# Patient Record
Sex: MALE | Race: NATIVE HAWAIIAN OR OTHER PACIFIC ISLANDER
[De-identification: names, ages, dates, MRNs, and addresses within clinical notes are randomized per-mention and may not be internally consistent; named-entity substitution may affect disease eponyms.]

---

## 2018-02-08 ENCOUNTER — HOSPITAL ENCOUNTER (INPATIENT)
Dept: HOSPITAL 31 - C.ER | Age: 83
LOS: 5 days | Discharge: HOME | DRG: 683 | End: 2018-02-13
Attending: FAMILY MEDICINE | Admitting: FAMILY MEDICINE
Payer: COMMERCIAL

## 2018-02-08 VITALS — BODY MASS INDEX: 26.4 KG/M2

## 2018-02-08 DIAGNOSIS — E11.649: ICD-10-CM

## 2018-02-08 DIAGNOSIS — N40.0: ICD-10-CM

## 2018-02-08 DIAGNOSIS — I50.9: ICD-10-CM

## 2018-02-08 DIAGNOSIS — Z93.3: ICD-10-CM

## 2018-02-08 DIAGNOSIS — E11.65: ICD-10-CM

## 2018-02-08 DIAGNOSIS — I13.0: ICD-10-CM

## 2018-02-08 DIAGNOSIS — N18.4: ICD-10-CM

## 2018-02-08 DIAGNOSIS — N17.9: Primary | ICD-10-CM

## 2018-02-08 DIAGNOSIS — N13.6: ICD-10-CM

## 2018-02-08 DIAGNOSIS — Z79.4: ICD-10-CM

## 2018-02-08 DIAGNOSIS — Q60.0: ICD-10-CM

## 2018-02-08 DIAGNOSIS — Z86.73: ICD-10-CM

## 2018-02-08 DIAGNOSIS — E78.5: ICD-10-CM

## 2018-02-08 DIAGNOSIS — Z85.048: ICD-10-CM

## 2018-02-08 DIAGNOSIS — D64.9: ICD-10-CM

## 2018-02-08 LAB
ALBUMIN SERPL-MCNC: 3.7 G/DL (ref 3.5–5)
ALBUMIN/GLOB SERPL: 1 {RATIO} (ref 1–2.1)
ALT SERPL-CCNC: 25 U/L (ref 21–72)
AST SERPL-CCNC: 21 U/L (ref 17–59)
BACTERIA #/AREA URNS HPF: (no result) /[HPF]
BASOPHILS # BLD AUTO: 0 K/UL (ref 0–0.2)
BASOPHILS NFR BLD: 0.1 % (ref 0–2)
BILIRUB UR-MCNC: NEGATIVE MG/DL
BUN SERPL-MCNC: 44 MG/DL (ref 9–20)
CALCIUM SERPL-MCNC: 8.8 MG/DL (ref 8.6–10.4)
COLOR UR: YELLOW
EOSINOPHIL # BLD AUTO: 0 K/UL (ref 0–0.7)
EOSINOPHIL NFR BLD: 0.3 % (ref 0–4)
ERYTHROCYTE [DISTWIDTH] IN BLOOD BY AUTOMATED COUNT: 17.8 % (ref 11.5–14.5)
GFR NON-AFRICAN AMERICAN: 20
GLUCOSE UR STRIP-MCNC: NORMAL MG/DL
HGB BLD-MCNC: 7.9 G/DL (ref 12–18)
LEUKOCYTE ESTERASE UR-ACNC: (no result) LEU/UL
LYMPHOCYTES # BLD AUTO: 1.5 K/UL (ref 1–4.3)
LYMPHOCYTES NFR BLD AUTO: 13 % (ref 20–40)
MCH RBC QN AUTO: 21 PG (ref 27–31)
MCHC RBC AUTO-ENTMCNC: 32.4 G/DL (ref 33–37)
MCV RBC AUTO: 64.9 FL (ref 80–94)
MONOCYTES # BLD: 1.3 K/UL (ref 0–0.8)
MONOCYTES NFR BLD: 11 % (ref 0–10)
NEUTROPHILS # BLD: 9 K/UL (ref 1.8–7)
NEUTROPHILS NFR BLD AUTO: 75.6 % (ref 50–75)
NRBC BLD AUTO-RTO: 0 % (ref 0–2)
PH UR STRIP: 7 [PH] (ref 5–8)
PLATELET # BLD: 195 K/UL (ref 130–400)
PMV BLD AUTO: 7.2 FL (ref 7.2–11.7)
PROT UR STRIP-MCNC: (no result) MG/DL
RBC # BLD AUTO: 3.75 MIL/UL (ref 4.4–5.9)
RBC # UR STRIP: NEGATIVE /UL
SP GR UR STRIP: 1.01 (ref 1–1.03)
URINE NITRATE: NEGATIVE
UROBILINOGEN UR-MCNC: NORMAL MG/DL (ref 0.2–1)
WBC # BLD AUTO: 11.9 K/UL (ref 4.8–10.8)
WBC CLUMPS # UR AUTO: (no result) /HPF

## 2018-02-08 RX ADMIN — HUMAN INSULIN SCH: 100 INJECTION, SOLUTION SUBCUTANEOUS at 21:21

## 2018-02-08 RX ADMIN — INSULIN DETEMIR SCH: 100 INJECTION, SOLUTION SUBCUTANEOUS at 21:20

## 2018-02-08 NOTE — CP.PCM.CON
History of Present Illness





- History of Present Illness


History of Present Illness: 





DICTATED





Past Patient History





- Past Medical History & Family History


Past Medical History?: Yes





- Past Social History


Smoking Status: Never Smoked





- CARDIAC


Hx Congestive Heart Failure: Yes


Hx Hypercholesterolemia: Yes


Hx Hypertension: Yes





- PULMONARY


Hx Respiratory Disorders: No





- NEUROLOGICAL


Hx Neurological Disorder: No





- HEENT


Hx HEENT Problems: No


Other/Comment: Reading glasses





- RENAL


Hx Chronic Kidney Disease: Yes





- ENDOCRINE/METABOLIC


Hx Endocrine Disorders: Yes


Hx Diabetes Mellitus Type 2: Yes





- HEMATOLOGICAL/ONCOLOGICAL


Hx Anemia: Yes





- INTEGUMENTARY


Hx Dermatological Problems: No





- MUSCULOSKELETAL/RHEUMATOLOGICAL


Hx Arthritis: Yes





- GASTROINTESTINAL


Hx Gastrointestinal Disorders: Yes


Hx Colostomy: Yes (left , colon cancer)


Other/Comment: Hx of colon cancer





- GENITOURINARY/GYNECOLOGICAL


Hx Genitourinary Disorders: Yes


Hx Prostate Problems: Yes


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





- PSYCHIATRIC


Hx Substance Use: No





- SURGICAL HISTORY


Hx Surgeries: Yes


Other/Comment: COLON RESECTION 2001 WITH COLOSTOMY; COLOSTOMY REVISION 2007





- ANESTHESIA


Hx Anesthesia: Yes


Hx Anesthesia Reactions: No


Hx Malignant Hyperthermia: No





Meds


Allergies/Adverse Reactions: 


 Allergies











Allergy/AdvReac Type Severity Reaction Status Date / Time


 


No Known Allergies Allergy   Verified 02/08/18 09:13














- Medications


Medications: 


 Current Medications





Acetaminophen (Tylenol 325mg Tab)  650 mg PO Q6 PRN


   PRN Reason: Fever >100.4 F


   Last Admin: 02/08/18 16:08 Dose:  650 mg


Clopidogrel Bisulfate (Plavix)  75 mg PO DAILY [facility]


Heparin Sodium (Porcine) (Heparin)  5,000 units SC Q8 [facility]


Sodium Chloride (Sodium Chloride 0.9%)  1,000 mls @ 80 mls/hr IV .[id number] [facility]


   Stop: 02/10/18 23:59


   Last Admin: 02/08/18 16:13 Dose:  80 mls/hr


Meropenem 500 mg/ Sodium (Chloride)  100 mls @ 100 mls/hr IVPB Q12H [facility]


Insulin Detemir (Levemir)  18 unit SC HS MANDEEP


Insulin Human Regular (Novolin R)  0 unit SC ACHS MANDEPE


   PRN Reason: Protocol


   Last Admin: 02/08/18 17:53 Dose:  Not Given


Multivitamins (Hexavitamin)  1 tab PO DAILY [facility]


Pantoprazole Sodium (Protonix Ec Tab)  40 mg PO DAILY [facility]


Rosuvastatin Calcium (Crestor)  10 mg PO HS [facility]


Tamsulosin HCl (Flomax)  0.4 mg PO DAILY [facility]











Results





- Vital Signs


Recent Vital Signs: 


 Last Vital Signs











Temp  101.4 F H  02/08/18 17:58


 


Pulse  90   02/08/18 17:58


 


Resp  18   02/08/18 17:58


 


BP  90/55 L  02/08/18 17:58


 


Pulse Ox  100   02/08/18 18:32














- Labs


Result Diagrams: 


 02/08/18 10:22





 02/08/18 10:22


Labs: 


 Laboratory Results - last 24 hr











  02/08/18 02/08/18 02/08/18





  09:36 10:22 10:22


 


WBC   11.9 H D 


 


RBC   3.75 L 


 


Hgb   7.9 L 


 


Hct   24.4 L 


 


MCV   64.9 L 


 


MCH   21.0 L 


 


MCHC   32.4 L 


 


RDW   17.8 H 


 


Plt Count   195 


 


MPV   7.2 


 


Neut % (Auto)   75.6 H 


 


Lymph % (Auto)   13.0 L 


 


Mono % (Auto)   11.0 H 


 


Eos % (Auto)   0.3 


 


Baso % (Auto)   0.1 


 


Neut # (Auto)   9.0 H 


 


Lymph # (Auto)   1.5 


 


Mono # (Auto)   1.3 H 


 


Eos # (Auto)   0.0 


 


Baso # (Auto)   0.0 


 


Sodium   


 


Potassium   


 


Chloride   


 


Carbon Dioxide   


 


Anion Gap   


 


BUN   


 


Creatinine   


 


Est GFR ( Amer)   


 


Est GFR (Non-Af Amer)   


 


POC Glucose (mg/dL)   


 


Random Glucose   


 


Calcium   


 


Total Bilirubin   


 


AST   


 


ALT   


 


Alkaline Phosphatase   


 


Total Protein   


 


Albumin   


 


Globulin   


 


Albumin/Globulin Ratio   


 


Urine Color    Yellow


 


Urine Clarity    Turbid


 


Urine pH    7.0


 


Ur Specific Gravity    1.012


 


Urine Protein    2+ H


 


Urine Glucose (UA)    Normal


 


Urine Ketones    Negative


 


Urine Blood    Negative


 


Urine Nitrate    Negative


 


Urine Bilirubin    Negative


 


Urine Urobilinogen    Normal


 


Ur Leukocyte Esterase    3+ H


 


Urine WBC (Auto)    2005 H


 


Urine RBC (Auto)    7 H


 


Urine WBC Clumps (Auto)    Many H


 


Urine Bacteria    Occ H


 


Influenza Typ A,B (EIA)  Negative for flu a/b  














  02/08/18 02/08/18





  10:22 17:51


 


WBC  


 


RBC  


 


Hgb  


 


Hct  


 


MCV  


 


MCH  


 


MCHC  


 


RDW  


 


Plt Count  


 


MPV  


 


Neut % (Auto)  


 


Lymph % (Auto)  


 


Mono % (Auto)  


 


Eos % (Auto)  


 


Baso % (Auto)  


 


Neut # (Auto)  


 


Lymph # (Auto)  


 


Mono # (Auto)  


 


Eos # (Auto)  


 


Baso # (Auto)  


 


Sodium  133 


 


Potassium  5.4 H 


 


Chloride  104 


 


Carbon Dioxide  20 L 


 


Anion Gap  15 


 


BUN  44 H 


 


Creatinine  3.0 H 


 


Est GFR ( Amer)  24 


 


Est GFR (Non-Af Amer)  20 


 


POC Glucose (mg/dL)   73


 


Random Glucose  142 H 


 


Calcium  8.8 


 


Total Bilirubin  1.4 H 


 


AST  21 


 


ALT  25 


 


Alkaline Phosphatase  90 


 


Total Protein  7.2 


 


Albumin  3.7 


 


Globulin  3.6 


 


Albumin/Globulin Ratio  1.0 


 


Urine Color  


 


Urine Clarity  


 


Urine pH  


 


Ur Specific Gravity  


 


Urine Protein  


 


Urine Glucose (UA)  


 


Urine Ketones  


 


Urine Blood  


 


Urine Nitrate  


 


Urine Bilirubin  


 


Urine Urobilinogen  


 


Ur Leukocyte Esterase  


 


Urine WBC (Auto)  


 


Urine RBC (Auto)  


 


Urine WBC Clumps (Auto)  


 


Urine Bacteria  


 


Influenza Typ A,B (EIA)

## 2018-02-08 NOTE — C.PDOC
History Of Present Illness


84 y/o male with PMHx of Rectal cancer, Renal resection and Incontinence, HTN 

and DM presents to ED with complaints of fever and dysuria since yesterday. 

Patient also complaints of back pain for 1 month for which he states his PMD 

ordered an MRI and xray. Patient denies change in sensation, cough, sob, chest 

pain, vomiting, diarrhea or any other complaints at this time. 


Time Seen by Provider: 02/08/18 09:04


Chief Complaint (Nursing): Male Genitourinary


History Per: Patient


History/Exam Limitations: no limitations


Onset/Duration Of Symptoms: Days


Current Symptoms Are (Timing): Still Present





Past Medical History


Reviewed: Historical Data, Nursing Documentation, Vital Signs


Vital Signs: 


 Last Vital Signs











Temp  101.4 F H  02/08/18 17:58


 


Pulse  90   02/08/18 17:58


 


Resp  18   02/08/18 17:58


 


BP  90/55 L  02/08/18 17:58


 


Pulse Ox  100   02/08/18 18:32














- Medical History


PMH: Anemia, Arthritis, CHF, Diabetes, HTN, Hypercholesterolemia, Hyperlipidemia

, Chronic Kidney Disease


Other Surgeries: colectomy:2001, hernia repair : 2007;  left renal resection: 

2017,





- [facility] Procedures








CLOSED ENDOSCOPIC BIOPSY OF LARGE INTESTINE (05/16/13)


CYSTOSCOPY NEC (08/22/13)


ESOPHAGOGASTRODUODENOSCOPY [EGD] W/CLOSED BIOPSY (06/21/15)


HEMODIALYSIS (06/21/15)


INSERT INDWELLING CATH (09/06/13)


URETHRAL DILATION (08/22/13)


VENOUS CATHETERIZATION FOR RENAL DIALYSIS (06/21/15)








Family History: States: No Known Family Hx





- Social History


Hx Tobacco Use: No


Hx Alcohol Use: No


Hx Substance Use: No





- Immunization History


Hx Tetanus Toxoid Vaccination: No


Hx Influenza Vaccination: No


Hx Pneumococcal Vaccination: No





Review Of Systems


Constitutional: Positive for: Fever.  Negative for: Chills


Gastrointestinal: Negative for: Nausea, Vomiting


Genitourinary: Positive for: Dysuria.  Negative for: Hematuria


Musculoskeletal: Positive for: Back Pain


Skin: Negative for: Rash


Neurological: Negative for: Weakness, Numbness





Physical Exam





- Physical Exam


Appears: Non-toxic, No Acute Distress


Skin: Warm, Dry, Pale, No Rash


Head: Atraumatic, Normacephalic


Eye(s): bilateral: Normal Inspection, EOMI


Nose: Normal


Oral Mucosa: Moist


Neck: Normal ROM, Supple


Chest: Symmetrical


Cardiovascular: Rhythm Regular


Respiratory: Normal Breath Sounds, No Accessory Muscle Use, No Rales, No Rhonchi

, No Wheezing


Gastrointestinal/Abdominal: Soft, No Tenderness, No Guarding, No Rebound, Other 

((+) LLQ  colostomy)


Back: No Vertebral Tenderness, Other (Paralumbar tenderness)


Extremity: Normal ROM


Neurological/Psych: Oriented x3





ED Course And Treatment





- Laboratory Results


Result Diagrams: 


 02/08/18 10:22





 02/08/18 10:22


O2 Sat by Pulse Oximetry: 100 (RA)


Pulse Ox Interpretation: Normal


Progress Note: Influenza Test, Blood work ordered. Tylenol administered.  Case 

discussed with dr Benitez, who requests hospitalist admission and Dr Benitez 

for ID.  Case discussed with Dr Jones, agreed upon plan and treatment.





Disposition





- Disposition


Disposition: HOSPITALIZED


Disposition Time: 14:00


Condition: STABLE





- Clinical Impression


Clinical Impression: 


 Acute renal failure, UTI (urinary tract infection)








- PA / NP / Resident Statement


MD/DO has reviewed & agrees with the documentation as recorded.





- Scribe Statement


The provider has reviewed the documentation as recorded by the Scribaida Kong





All medical record entries made by the Pedro Pablo were at my direction and 

personally dictated by me. I have reviewed the chart and agree that the record 

accurately reflects my personal performance of the history, physical exam, 

medical decision making, and the department course for this patient. I have 

also personally directed, reviewed, and agree with the discharge instructions 

and disposition.

## 2018-02-08 NOTE — CT
PROCEDURE:  CT Abdomen and Pelvis without Oral or IV contrast.



HISTORY:

check for nephrolithiasis or pyelonephritis



COMPARISON:

CT of the abdomen and pelvis without IV contrast performed 11/30/16



TECHNIQUE:

Contiguous axial images of the abdomen and pelvis. No oral or IV 

contrast administered. Coronal and Sagittal reformats generated and 

reviewed. 



Radiation dose:



Total exam DLP = 739.64 mGy-cm.



This CT exam was performed using one or more of the following dose 

reduction techniques: Automated exposure control, adjustment of the 

mA and/or kV according to patient size, and/or use of iterative 

reconstruction technique.



FINDINGS:

There is limited evaluation of the solid organs without the 

administration of IV contrast.



LOWER THORAX:

Bibasilar atelectasis. There is no visible pleural effusion or 

pneumothorax. 



LIVER:

Unremarkable unenhanced appearance. 



GALLBLADDER AND BILE DUCTS:

Cholelithiasis. 



PANCREAS:

Unremarkable unenhanced appearance. 



SPLEEN:

Unremarkable unenhanced appearance. 



ADRENALS:

Unremarkable unenhanced appearance. 



KIDNEYS AND URETERS:

Right perinephric stranding. Moderate hydroureteronephrosis without 

obstructing calculus evident. Point of obstruction of the distal 

ureter appears near coarse vascular calcifications in the right 

pelvis. The left kidney is absent, by history surgically resected.



BLADDER:

The urinary bladder appears unremarkable.



REPRODUCTIVE:

Unremarkable. 



APPENDIX:

The appendix appears within normal limits of caliber.



BOWEL:

The stomach is nondistended. 



Lack of oral contrast limits evaluation for bowel pathology. No 

evidence of bowel obstruction. Large left peristomal herniation 

containing small and large bowel loops.  Left-sided colostomy. Status 

post distal large bowel resection. Moderate-sized right inguinal 

hernia containing loops of small bowel without evidence of 

obstruction. 



PERITONEUM:

No significant free fluid. No definite free air.



LYMPH NODES:

No bulky lymphadenopathy appreciated.



VASCULATURE:

Atherosclerotic calcifications. No aortic aneurysm. 



BONES:

Osseous demineralization.  Degenerative changes. Scoliosis. 

Nonspecific sclerotic focus within the left femur. 



OTHER FINDINGS:

Re-identified prominent presacral soft tissue. 



Previously demonstrated masslike density within the midline lower 

abdomen not appreciated on the current noncontrast study. Correlate 

clinically. 



Postsurgical changes, anterior abdominal wall. Presacral postsurgical 

changes re-identified. 



IMPRESSION:

Right perinephric stranding. Moderate hydroureteronephrosis without 

obstructing calculus evident. Point of obstruction of the distal 

ureter appears likely near coarse vascular calcifications in the 

right pelvis. The left kidney is absent, by history surgically 

resected. 



Re-identified prominent presacral soft tissue. 



Large left peristomal herniation containing small and large bowel 

loops. Left-sided colostomy. Status post distal large bowel 

resection. Moderate-sized right inguinal hernia containing loops of 

small bowel without evidence of obstruction. 



Previously demonstrated masslike density within the midline lower 

abdomen not appreciated on the current noncontrast study. Correlate 

clinically. 



Cholelithiasis. 



Additional findings as above.

## 2018-02-08 NOTE — CP.PCM.HP
<Ariel Yao - Last Filed: 02/08/18 19:08>





History of Present Illness





- History of Present Illness


History of Present Illness: 





PGY-1 H&P for Dr. Jones


CC: "burning with urination and fever"





This is a 85 year old male with PMHx of rectal cancer s/p colectomy in 2001, 

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 

mass s/p resection in 2017, HTN, DM, HLD who complains of fever and burning 

with urination. The fever began 2 days ago; however, the dysuria began 

yesterday. Patient measured his temperature at home 103 degrees yesterday and 

102 degrees this morning before coming into the ED. Per patient, he will 

experience a UTI about once per year. Patient admits associated urinary 

frequency yesterday. Per patient, it felt like he was urinating every hour. 

Patient denies flank pain but admits chronic lower back pain for the past few 

weeks. Patient denies chills, chest pain, dyspnea, abdominal pain.  








PMHx: rectal cancer s/p colectomy in 2001, congenital left sided renal agenesis 

diagnosed during the colectomy, left renal mass s/p resection in 2017, HTN, DM, 

HLD


PSHx: colectomy with colostomy formation on the right side of the abdomen in 

2001, right sided hernia repair in 2007 with movement of ostomy to the left 

side of the abdomen in 2012, left renal mass removal in January 2017,  


Allergies: NKDA


Social: Denies tobacco, alcohol, drugs. Lives with his wife.


Family Hx: denies





PMD: Dr. Jessee Benitez


ID: Dr. Anil Benitez


Nephro: Dr. Seo





Home meds: Plavix 75 mg PO daily, Glimepiride 4 mg PO daily, Januvia 25 mg PO 

daily, Flomax 0.4 mg PO daily, Lipitor 20 mg PO daily, Ferrous sulfate 325 mg 

PO BID, Levemir 18 units SC HS, Loratadine 10 mg PO daily, Ramipril 10 mg PO 

daily, folic acid 1 mg tab PO daily





Present on Admission





- Present on Admission


Any Indicators Present on Admission: No





Review of Systems





- Constitutional


Constitutional: Fever.  absent: Chills





- EENT


Eyes: absent: Change in Vision


Ears: absent: Decreased Hearing


Nose/Mouth/Throat: absent: Nasal Congestion





- Cardiovascular


Cardiovascular: absent: Chest Pain





- Respiratory


Respiratory: absent: Cough, Dyspnea





- Gastrointestinal


Gastrointestinal: absent: Abdominal Pain, Constipation, Diarrhea, Hematochezia, 

Melena, Nausea, Vomiting





- Genitourinary


Genitourinary: Dysuria, Urinary Frequency.  absent: Hematuria





- Musculoskeletal


Musculoskeletal: Back Pain





- Integumentary


Integumentary: absent: Rash





- Neurological


Neurological: absent: Weakness





- Psychiatric


Psychiatric: absent: Anxiety, Change in Appetite





- Endocrine


Endocrine: absent: Palpitations





Past Patient History





- Past Medical History & Family History


Past Medical History?: Yes





- Past Social History


Smoking Status: Never Smoked





- CARDIAC


Hx Congestive Heart Failure: Yes


Hx Hypercholesterolemia: Yes


Hx Hypertension: Yes





- PULMONARY


Hx Respiratory Disorders: No





- NEUROLOGICAL


Hx Neurological Disorder: No





- HEENT


Hx HEENT Problems: No


Other/Comment: Reading glasses





- RENAL


Hx Chronic Kidney Disease: Yes





- ENDOCRINE/METABOLIC


Hx Endocrine Disorders: Yes


Hx Diabetes Mellitus Type 2: Yes





- HEMATOLOGICAL/ONCOLOGICAL


Hx Anemia: Yes





- INTEGUMENTARY


Hx Dermatological Problems: No





- MUSCULOSKELETAL/RHEUMATOLOGICAL


Hx Arthritis: Yes





- GASTROINTESTINAL


Hx Gastrointestinal Disorders: Yes


Hx Colostomy: Yes (left , colon cancer)


Other/Comment: Hx of colon cancer





- GENITOURINARY/GYNECOLOGICAL


Hx Genitourinary Disorders: Yes


Hx Prostate Problems: Yes


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





- PSYCHIATRIC


Hx Substance Use: No





- SURGICAL HISTORY


Hx Surgeries: Yes


Other/Comment: COLON RESECTION 2001 WITH COLOSTOMY; COLOSTOMY REVISION 2007





- ANESTHESIA


Hx Anesthesia: Yes


Hx Anesthesia Reactions: No


Hx Malignant Hyperthermia: No





Meds


Allergies/Adverse Reactions: 


 Allergies











Allergy/AdvReac Type Severity Reaction Status Date / Time


 


No Known Allergies Allergy   Verified 02/08/18 09:13














Physical Exam





- Constitutional


Appears: No Acute Distress





- Head Exam


Head Exam: ATRAUMATIC, NORMOCEPHALIC





- Eye Exam


Eye Exam: EOMI, Normal appearance





- ENT Exam


ENT Exam: Mucous Membranes Moist





- Respiratory Exam


Respiratory Exam: Clear to Auscultation Bilateral, NORMAL BREATHING PATTERN.  

absent: Rales, Rhonchi, Wheezes





- Cardiovascular Exam


Cardiovascular Exam: REGULAR RHYTHM, +S1, +S2





- GI/Abdominal Exam


GI & Abdominal Exam: Normal Bowel Sounds, Soft.  absent: Tenderness


Additional comments: 





Left sided colostomy with no evidence of infection in the surrounding regions





- Extremities Exam


Extremities exam: Positive for: pedal pulses present.  Negative for: pedal edema

, tenderness





- Neurological Exam


Neurological exam: Alert, CN II-XII Intact, Oriented x3





- Psychiatric Exam


Psychiatric exam: Normal Affect, Normal Mood





- Skin


Skin Exam: Dry, Warm


Additional comments: 





Surgical scar in the center of the lower abdomen. Colostomy on the left lower 

abdomen with no evidence of infection in the surrounding area.





Results





- Vital Signs


Recent Vital Signs: 





 Last Vital Signs











Temp  100.4 F H  02/08/18 10:51


 


Pulse  97 H  02/08/18 08:55


 


Resp  20   02/08/18 08:55


 


BP  123/84   02/08/18 08:55


 


Pulse Ox  100   02/08/18 09:45














- Labs


Result Diagrams: 


 02/08/18 10:22





 02/08/18 10:22


Labs: 





 Laboratory Results - last 24 hr











  02/08/18 02/08/18 02/08/18





  09:36 10:22 10:22


 


WBC   11.9 H D 


 


RBC   3.75 L 


 


Hgb   7.9 L 


 


Hct   24.4 L 


 


MCV   64.9 L 


 


MCH   21.0 L 


 


MCHC   32.4 L 


 


RDW   17.8 H 


 


Plt Count   195 


 


MPV   7.2 


 


Neut % (Auto)   75.6 H 


 


Lymph % (Auto)   13.0 L 


 


Mono % (Auto)   11.0 H 


 


Eos % (Auto)   0.3 


 


Baso % (Auto)   0.1 


 


Neut # (Auto)   9.0 H 


 


Lymph # (Auto)   1.5 


 


Mono # (Auto)   1.3 H 


 


Eos # (Auto)   0.0 


 


Baso # (Auto)   0.0 


 


Sodium   


 


Potassium   


 


Chloride   


 


Carbon Dioxide   


 


Anion Gap   


 


BUN   


 


Creatinine   


 


Est GFR ( Amer)   


 


Est GFR (Non-Af Amer)   


 


Random Glucose   


 


Calcium   


 


Total Bilirubin   


 


AST   


 


ALT   


 


Alkaline Phosphatase   


 


Total Protein   


 


Albumin   


 


Globulin   


 


Albumin/Globulin Ratio   


 


Urine Color    Yellow


 


Urine Clarity    Turbid


 


Urine pH    7.0


 


Ur Specific Gravity    1.012


 


Urine Protein    2+ H


 


Urine Glucose (UA)    Normal


 


Urine Ketones    Negative


 


Urine Blood    Negative


 


Urine Nitrate    Negative


 


Urine Bilirubin    Negative


 


Urine Urobilinogen    Normal


 


Ur Leukocyte Esterase    3+ H


 


Urine WBC (Auto)    2005 H


 


Urine RBC (Auto)    7 H


 


Urine WBC Clumps (Auto)    Many H


 


Urine Bacteria    Occ H


 


Influenza Typ A,B (EIA)  Negative for flu a/b  














  02/08/18





  10:22


 


WBC 


 


RBC 


 


Hgb 


 


Hct 


 


MCV 


 


MCH 


 


MCHC 


 


RDW 


 


Plt Count 


 


MPV 


 


Neut % (Auto) 


 


Lymph % (Auto) 


 


Mono % (Auto) 


 


Eos % (Auto) 


 


Baso % (Auto) 


 


Neut # (Auto) 


 


Lymph # (Auto) 


 


Mono # (Auto) 


 


Eos # (Auto) 


 


Baso # (Auto) 


 


Sodium  133


 


Potassium  5.4 H


 


Chloride  104


 


Carbon Dioxide  20 L


 


Anion Gap  15


 


BUN  44 H


 


Creatinine  3.0 H


 


Est GFR ( Amer)  24


 


Est GFR (Non-Af Amer)  20


 


Random Glucose  142 H


 


Calcium  8.8


 


Total Bilirubin  1.4 H


 


AST  21


 


ALT  25


 


Alkaline Phosphatase  90


 


Total Protein  7.2


 


Albumin  3.7


 


Globulin  3.6


 


Albumin/Globulin Ratio  1.0


 


Urine Color 


 


Urine Clarity 


 


Urine pH 


 


Ur Specific Gravity 


 


Urine Protein 


 


Urine Glucose (UA) 


 


Urine Ketones 


 


Urine Blood 


 


Urine Nitrate 


 


Urine Bilirubin 


 


Urine Urobilinogen 


 


Ur Leukocyte Esterase 


 


Urine WBC (Auto) 


 


Urine RBC (Auto) 


 


Urine WBC Clumps (Auto) 


 


Urine Bacteria 


 


Influenza Typ A,B (EIA) 














Assessment & Plan





- Assessment and Plan (Free Text)


Plan: 





UTI


+3 leukocyte esterase


f/u urine cultures


ID consult Dr. Benitez, help appreciated


Per Dr. Benitez Azactam 2 gm IV one time dose and Merropenem 500 mg IV Q12H


f/u CT abdomen/pelvis without contrast


f/u abdominal ultrasound





Acute on Chronic Kidney Disease


Baseline creatinine 1.5


NS 80 cc/hr


Nephrology consult Dr. Seo, help appreciated





History of Hypertension


Held home Ramipril due to acute kidney injury


Monitor BP





History of Diabetes


f/u hemoglobin A1c


Resumed home Levemir 18 units HS


Resumed home Januvia 25 mg PO daily


Regular ISS





History of Hyperlipidemia


Home Lipitor not on formulary. 


Crestor 10 mg PO HS





Prophylactic Measure


Heparin SC Q8


Protonix 40 mg PO daily


Diabetic diet





Case DW Dr. Karen Yao PGY-1





<Jayson Jones H - Last Filed: 02/09/18 07:20>





Results





- Vital Signs


Recent Vital Signs: 





 Last Vital Signs











Temp  98.3 F   02/09/18 00:50


 


Pulse  87   02/09/18 00:50


 


Resp  20   02/09/18 00:50


 


BP  96/56 L  02/09/18 00:50


 


Pulse Ox  97   02/09/18 00:50














- Labs


Result Diagrams: 


 02/08/18 10:22





 02/08/18 10:22


Labs: 





 Laboratory Results - last 24 hr











  02/08/18 02/08/18 02/08/18





  09:36 10:22 10:22


 


WBC   11.9 H D 


 


RBC   3.75 L 


 


Hgb   7.9 L 


 


Hct   24.4 L 


 


MCV   64.9 L 


 


MCH   21.0 L 


 


MCHC   32.4 L 


 


RDW   17.8 H 


 


Plt Count   195 


 


MPV   7.2 


 


Neut % (Auto)   75.6 H 


 


Lymph % (Auto)   13.0 L 


 


Mono % (Auto)   11.0 H 


 


Eos % (Auto)   0.3 


 


Baso % (Auto)   0.1 


 


Neut # (Auto)   9.0 H 


 


Lymph # (Auto)   1.5 


 


Mono # (Auto)   1.3 H 


 


Eos # (Auto)   0.0 


 


Baso # (Auto)   0.0 


 


Sodium   


 


Potassium   


 


Chloride   


 


Carbon Dioxide   


 


Anion Gap   


 


BUN   


 


Creatinine   


 


Est GFR ( Amer)   


 


Est GFR (Non-Af Amer)   


 


POC Glucose (mg/dL)   


 


Random Glucose   


 


Calcium   


 


Total Bilirubin   


 


AST   


 


ALT   


 


Alkaline Phosphatase   


 


Total Protein   


 


Albumin   


 


Globulin   


 


Albumin/Globulin Ratio   


 


Urine Color    Yellow


 


Urine Clarity    Turbid


 


Urine pH    7.0


 


Ur Specific Gravity    1.012


 


Urine Protein    2+ H


 


Urine Glucose (UA)    Normal


 


Urine Ketones    Negative


 


Urine Blood    Negative


 


Urine Nitrate    Negative


 


Urine Bilirubin    Negative


 


Urine Urobilinogen    Normal


 


Ur Leukocyte Esterase    3+ H


 


Urine WBC (Auto)    2005 H


 


Urine RBC (Auto)    7 H


 


Urine WBC Clumps (Auto)    Many H


 


Urine Bacteria    Occ H


 


Influenza Typ A,B (EIA)  Negative for flu a/b  














  02/08/18 02/08/18 02/08/18





  10:22 17:51 19:07


 


WBC   


 


RBC   


 


Hgb   


 


Hct   


 


MCV   


 


MCH   


 


MCHC   


 


RDW   


 


Plt Count   


 


MPV   


 


Neut % (Auto)   


 


Lymph % (Auto)   


 


Mono % (Auto)   


 


Eos % (Auto)   


 


Baso % (Auto)   


 


Neut # (Auto)   


 


Lymph # (Auto)   


 


Mono # (Auto)   


 


Eos # (Auto)   


 


Baso # (Auto)   


 


Sodium  133  


 


Potassium  5.4 H  


 


Chloride  104  


 


Carbon Dioxide  20 L  


 


Anion Gap  15  


 


BUN  44 H  


 


Creatinine  3.0 H  


 


Est GFR ( Amer)  24  


 


Est GFR (Non-Af Amer)  20  


 


POC Glucose (mg/dL)   73  51 L


 


Random Glucose  142 H  


 


Calcium  8.8  


 


Total Bilirubin  1.4 H  


 


AST  21  


 


ALT  25  


 


Alkaline Phosphatase  90  


 


Total Protein  7.2  


 


Albumin  3.7  


 


Globulin  3.6  


 


Albumin/Globulin Ratio  1.0  


 


Urine Color   


 


Urine Clarity   


 


Urine pH   


 


Ur Specific Gravity   


 


Urine Protein   


 


Urine Glucose (UA)   


 


Urine Ketones   


 


Urine Blood   


 


Urine Nitrate   


 


Urine Bilirubin   


 


Urine Urobilinogen   


 


Ur Leukocyte Esterase   


 


Urine WBC (Auto)   


 


Urine RBC (Auto)   


 


Urine WBC Clumps (Auto)   


 


Urine Bacteria   


 


Influenza Typ A,B (EIA)   














  02/08/18 02/08/18 02/09/18





  19:40 21:19 06:22


 


WBC   


 


RBC   


 


Hgb   


 


Hct   


 


MCV   


 


MCH   


 


MCHC   


 


RDW   


 


Plt Count   


 


MPV   


 


Neut % (Auto)   


 


Lymph % (Auto)   


 


Mono % (Auto)   


 


Eos % (Auto)   


 


Baso % (Auto)   


 


Neut # (Auto)   


 


Lymph # (Auto)   


 


Mono # (Auto)   


 


Eos # (Auto)   


 


Baso # (Auto)   


 


Sodium   


 


Potassium   


 


Chloride   


 


Carbon Dioxide   


 


Anion Gap   


 


BUN   


 


Creatinine   


 


Est GFR ( Amer)   


 


Est GFR (Non-Af Amer)   


 


POC Glucose (mg/dL)  82  99  63 L


 


Random Glucose   


 


Calcium   


 


Total Bilirubin   


 


AST   


 


ALT   


 


Alkaline Phosphatase   


 


Total Protein   


 


Albumin   


 


Globulin   


 


Albumin/Globulin Ratio   


 


Urine Color   


 


Urine Clarity   


 


Urine pH   


 


Ur Specific Gravity   


 


Urine Protein   


 


Urine Glucose (UA)   


 


Urine Ketones   


 


Urine Blood   


 


Urine Nitrate   


 


Urine Bilirubin   


 


Urine Urobilinogen   


 


Ur Leukocyte Esterase   


 


Urine WBC (Auto)   


 


Urine RBC (Auto)   


 


Urine WBC Clumps (Auto)   


 


Urine Bacteria   


 


Influenza Typ A,B (EIA)   














  02/09/18 02/09/18





  06:24 07:03


 


WBC  


 


RBC  


 


Hgb  


 


Hct  


 


MCV  


 


MCH  


 


MCHC  


 


RDW  


 


Plt Count  


 


MPV  


 


Neut % (Auto)  


 


Lymph % (Auto)  


 


Mono % (Auto)  


 


Eos % (Auto)  


 


Baso % (Auto)  


 


Neut # (Auto)  


 


Lymph # (Auto)  


 


Mono # (Auto)  


 


Eos # (Auto)  


 


Baso # (Auto)  


 


Sodium  


 


Potassium  


 


Chloride  


 


Carbon Dioxide  


 


Anion Gap  


 


BUN  


 


Creatinine  


 


Est GFR ( Amer)  


 


Est GFR (Non-Af Amer)  


 


POC Glucose (mg/dL)  62 L  61 L


 


Random Glucose  


 


Calcium  


 


Total Bilirubin  


 


AST  


 


ALT  


 


Alkaline Phosphatase  


 


Total Protein  


 


Albumin  


 


Globulin  


 


Albumin/Globulin Ratio  


 


Urine Color  


 


Urine Clarity  


 


Urine pH  


 


Ur Specific Gravity  


 


Urine Protein  


 


Urine Glucose (UA)  


 


Urine Ketones  


 


Urine Blood  


 


Urine Nitrate  


 


Urine Bilirubin  


 


Urine Urobilinogen  


 


Ur Leukocyte Esterase  


 


Urine WBC (Auto)  


 


Urine RBC (Auto)  


 


Urine WBC Clumps (Auto)  


 


Urine Bacteria  


 


Influenza Typ A,B (EIA)  














Attending/Attestation





- Attestation


I have personally seen and examined this patient.: Yes


I have fully participated in the care of the patient.: Yes


I have reviewed all pertinent clinical information: Yes


Notes (Text): 





02/09/18 07:20


Medical attending: Patient was seen and examined by me, I saw the patient with 

the medical resident in the emergency room. I reviewed the above note and agree 

with the above note by the resident.





This is a 85 -year-old male past medical history of rectal cancer requiring a 

colostomy creation in 2001. 





He also has a history of left renal agenesis that was found the during 2001 and 

then removal of the remaining left renal mass in 2017





He reported several days of fevers, and dysuria. He also reported some flank 

pain as well asked him if he had further pain in his back and he says that he 

has had long going chronic back pain for some time now. He denied other things 

such as chest pain shortness of breath and abdominal pain.





In the emergency room, UA was done which was highly suggestive of a UTI. He 

also had a documented fever in the emergency room. His creatinine was 3.0, from 

review of recent lab work in December 2017 his creatinine at that time was 1.5 

and it appears that his baseline creatinine is about 1.4-1.5. He does tell us 

that he feels somewhat dry





So at this time considering his intermittent flank pain, his back pain related 

get an abdominal ultrasound to assess for any potential obstruction to the 

kidney as well as a CT scan without contrast to possibly assess for potential 

pyelonephritis.





With regards to IV antibiotics were start Zosyn, can have to be renally dosed 

for the time being until he could be seen by his infectious disease physician 

and the patient will also need to be seen by his nephrologist as well while the 

patient is here. I later learned that infectious disease changed antibiotics 

over to is active and meropenem so at this time were waiting on the urine 

cultures and blood cultures





We are going to place the patient on the intravenous fluids. I reviewed the 

chest x-ray and it does not look fluid overloaded so were to give the patient 

intravenous fluids. I told the residence to check a new chest x-ray tomorrow 

just in case we do make him fluid overload.





Jayson Jones

## 2018-02-08 NOTE — RAD
HISTORY:

pre-admission  



COMPARISON:

12/29/2016 



FINDINGS:



LUNGS:

No consolidation. Central pulmonary vasculature and overall 

interstitial lung markings appear slightly increased yet similar 

appearing and nonspecific ; nevertheless chronic changes are inferred



PLEURA:

No significant pleural effusion identified, no pneumothorax apparent.



CARDIOVASCULAR:

Minimal cardiomegaly -similar



OSSEOUS STRUCTURES:

Thoracic spondylosis.  Bilateral shoulder arthrosis



VISUALIZED UPPER ABDOMEN:

Normal.



OTHER FINDINGS:

None.



IMPRESSION:

No active disease.

## 2018-02-08 NOTE — US
HISTORY:

acute kidney injury



COMPARISON:

CT of the abdomen and pelvis without oral or IV contrast performed 

2/8/18



TECHNIQUE:

Sonographic evaluation of the abdomen.



FINDINGS:



LIVER:

Measures 18.2 cm in sagittal dimension. Echogenic liver may be seen 

in setting of hepatic parenchymal disease or fatty infiltration.  No 

focal hepatic mass identified. The main portal vein appears patent 

with normal directional flow.   No intrahepatic bile duct dilatation.



GALLBLADDER:

Gallstones. No gallbladder wall thickening. Negative sonographic 

Melissa's sign as assessed by the sonographer.



COMMON BILE DUCT:

Measures 4 mm. 



PANCREAS:

Not well visualized.



RIGHT KIDNEY:

Measures 11.2 x 6.1 x 6.8cm.  Moderate right-sided hydronephrosis. No 

obstructing calculus identified.



LEFT KIDNEY:

Not visualized ; surgically removed per patient provided history. 



SPLEEN:

Measures approximately 10.6 cm. 



AORTA:

Limited views appear unremarkable. 



IVC:

Limited views appear unremarkable. 



OTHER FINDINGS:

None. 



IMPRESSION:

Echogenic liver may be seen in setting of hepatic parenchymal disease 

or fatty infiltration.  



Cholelithiasis. 



Moderate right-sided hydronephrosis.  Absent left kidney.

## 2018-02-09 LAB
ALBUMIN SERPL-MCNC: 3.4 G/DL (ref 3.5–5)
ALBUMIN/GLOB SERPL: 0.9 {RATIO} (ref 1–2.1)
ALT SERPL-CCNC: 30 U/L (ref 21–72)
ANISOCYTOSIS BLD QL SMEAR: SLIGHT
AST SERPL-CCNC: 31 U/L (ref 17–59)
BASOPHILS # BLD AUTO: 0 K/UL (ref 0–0.2)
BASOPHILS NFR BLD: 0.2 % (ref 0–2)
BILIRUB UR-MCNC: NEGATIVE MG/DL
BUN SERPL-MCNC: 44 MG/DL (ref 9–20)
BURR CELLS BLD QL SMEAR: SLIGHT
CALCIUM SERPL-MCNC: 8.4 MG/DL (ref 8.6–10.4)
EOSINOPHIL # BLD AUTO: 0.1 K/UL (ref 0–0.7)
EOSINOPHIL NFR BLD: 0.8 % (ref 0–4)
EOSINOPHIL NFR BLD: 1 % (ref 0–4)
ERYTHROCYTE [DISTWIDTH] IN BLOOD BY AUTOMATED COUNT: 18.1 % (ref 11.5–14.5)
GFR NON-AFRICAN AMERICAN: 23
GLUCOSE UR STRIP-MCNC: NORMAL MG/DL
HGB BLD-MCNC: 7.9 G/DL (ref 12–18)
HYPOCHROMIC: SLIGHT
LEUKOCYTE ESTERASE UR-ACNC: (no result) LEU/UL
LYMPHOCYTE: 13 % (ref 20–40)
LYMPHOCYTES # BLD AUTO: 1.1 K/UL (ref 1–4.3)
LYMPHOCYTES NFR BLD AUTO: 9.4 % (ref 20–40)
MAGNESIUM SERPL-MCNC: 1.6 MG/DL (ref 1.6–2.3)
MCH RBC QN AUTO: 20.9 PG (ref 27–31)
MCHC RBC AUTO-ENTMCNC: 32.1 G/DL (ref 33–37)
MCV RBC AUTO: 65.3 FL (ref 80–94)
MICROCYTES BLD QL SMEAR: SLIGHT
MONOCYTE: 6 % (ref 0–10)
MONOCYTES # BLD: 0.9 K/UL (ref 0–0.8)
MONOCYTES NFR BLD: 8 % (ref 0–10)
NEUTROPHILS # BLD: 9.4 K/UL (ref 1.8–7)
NEUTROPHILS NFR BLD AUTO: 79 % (ref 50–75)
NEUTROPHILS NFR BLD AUTO: 81.6 % (ref 50–75)
NEUTS BAND NFR BLD: 1 % (ref 0–2)
NRBC BLD AUTO-RTO: 0 % (ref 0–2)
OVALOCYTES BLD QL SMEAR: SLIGHT
PH UR STRIP: 5 [PH] (ref 5–8)
PLATELET # BLD EST: NORMAL 10*3/UL
PLATELET # BLD: 185 K/UL (ref 130–400)
PMV BLD AUTO: 7.3 FL (ref 7.2–11.7)
PROT UR STRIP-MCNC: NEGATIVE MG/DL
RBC # BLD AUTO: 3.76 MIL/UL (ref 4.4–5.9)
RBC # UR STRIP: NEGATIVE /UL
SP GR UR STRIP: 1.01 (ref 1–1.03)
SQUAMOUS EPITHIAL: < 1 /HPF (ref 0–5)
TARGETS BLD QL SMEAR: SLIGHT
TEARDROP CELLS: SLIGHT
TOTAL CELLS COUNTED BLD: 100
URINE NITRATE: NEGATIVE
UROBILINOGEN UR-MCNC: NORMAL MG/DL (ref 0.2–1)
WBC # BLD AUTO: 11.5 K/UL (ref 4.8–10.8)

## 2018-02-09 RX ADMIN — PANTOPRAZOLE SODIUM SCH MG: 40 TABLET, DELAYED RELEASE ORAL at 10:25

## 2018-02-09 RX ADMIN — Medication SCH TAB: at 10:26

## 2018-02-09 RX ADMIN — HUMAN INSULIN SCH UNIT: 100 INJECTION, SOLUTION SUBCUTANEOUS at 18:27

## 2018-02-09 RX ADMIN — INSULIN DETEMIR SCH UNIT: 100 INJECTION, SOLUTION SUBCUTANEOUS at 22:09

## 2018-02-09 RX ADMIN — HUMAN INSULIN SCH UNIT: 100 INJECTION, SOLUTION SUBCUTANEOUS at 12:46

## 2018-02-09 RX ADMIN — HUMAN INSULIN SCH: 100 INJECTION, SOLUTION SUBCUTANEOUS at 21:43

## 2018-02-09 RX ADMIN — Medication SCH MG: at 18:28

## 2018-02-09 RX ADMIN — HUMAN INSULIN SCH: 100 INJECTION, SOLUTION SUBCUTANEOUS at 07:34

## 2018-02-09 NOTE — CP.PCM.CON
History of Present Illness





- History of Present Illness


History of Present Illness: 








This is a 85 year old male with PMHx of colon cancer s/p colectomy in 2001, 

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 

mass s/p resection in 2017, HTN, DM, HLD who complains of fever and burning 

with urination. The fever began 2 days ago; however, the dysuria began 

yesterday. Patient measured his temperature at home 103 degrees yesterday and 

102 degrees this morning before coming into the ED. Per patient, he will 

experience a UTI about once per year. Patient admits associated urinary 

frequency yesterday. Per patient, it felt like he was urinating every hour. 

Patient denies flank pain but admits chronic lower back pain for the past few 

weeks. Patient denies chills, chest pain, dyspnea, abdominal pain.  








PMHx: rectal cancer s/p colectomy in 2001, congenital left sided renal agenesis 

diagnosed during the colectomy, left renal mass s/p resection in 2017, HTN,DL, 

DM 2, HLD,CKD 3-4, proteinuria


PSHx: colectomy with colostomy formation on the right side of the abdomen in 

2001, right sided hernia repair in 2007 with movement of ostomy to the left 

side of the abdomen in 2012, left renal mass removal in January 2017,  


Allergies: NKDA


Social: Denies tobacco, alcohol, drugs. Lives with his wife.


Family Hx: denies CKD





PMD: Dr. Jessee Benitez


ID: Dr. Anil Benitez


Nephro: Dr. Seo





Home meds: Plavix 75 mg PO daily, Glimepiride 4 mg PO daily, Januvia 25 mg PO 

daily, Flomax 0.4 mg PO daily, Lipitor 20 mg PO daily, Ferrous sulfate 325 mg 

PO BID, Levemir 18 units SC HS, Loratadine 10 mg PO daily, Ramipril 10 mg PO 

daily, folic acid 1 mg tab PO daily; amlodipine, ramipril











Review of Systems





- Constitutional


Constitutional: Chills, Malaise, Weakness





- EENT


Eyes: absent: As Per HPI, Blind Spots, Blurred Vision, Change in Vision, 

Decreased Night Vision, Diplopia, Discharge, Dry Eye, Exophthalmos, Floaters, 

Irritation, Itchy Eyes, Loss of Peripheral Vision, Pain, Photophobia, Requires 

Corrective Lenses, Sees Flashes, Spots in Vision, Tunnel Vision, Other Visual 

Disturbances, Loss of Vision, Other


Ears: absent: As Per HPI, Decreased Hearing, Ear Discharge, Ear Pain, Tinnitus, 

Abnormal Hearing, Disequilibrium, Dizziness, Other


Nose/Mouth/Throat: absent: As Per HPI, Epistaxis, Nasal Congestion, Nasal 

Discharge, Nasal Obstruction, Nasal Trauma, Nose Pain, Post Nasal Drip, Sinus 

Pain, Sinus Pressure, Bleeding Gums, Change in Voice, Dental Pain, Dry Mouth, 

Dysphagia, Halitosis, Hoarsness, Lip Swelling, Mouth Lesions, Mouth Pain, 

Odynophagia, Sore Throat, Throat Swelling, Tongue Swelling, Facial Pain, Neck 

Pain, Neck Mass, Other





- Cardiovascular


Cardiovascular: Dyspnea on Exertion





- Respiratory


Respiratory: absent: As Per HPI, Cough, Dyspnea, Hemoptysis, Dyspnea on Exertion

, Wheezing, Snoring, Stridor, Pain on Inspiration, Chest Congestion, Excessive 

Mucous Production, Change in Mucous Color, Pain with Coughing, Other





- Gastrointestinal


Gastrointestinal: Abdominal Pain





- Genitourinary


Genitourinary: Flank Pain, Urinary Frequency





- Musculoskeletal


Musculoskeletal: Muscle Cramps, Myalgias





- Neurological


Neurological: Weakness





Past Patient History





- Past Medical History & Family History


Past Medical History?: Yes


Past Family History: Reviewed and not pertinent





- Past Social History


Smoking Status: Never Smoked


Chewing Tobacco Use: No


Cigar Use: No


Alcohol: None


Drugs: Denies


Home Situation {Lives}: With Family





- CARDIAC


Hx Congestive Heart Failure: Yes


Hx Hypercholesterolemia: Yes


Hx Hypertension: Yes





- PULMONARY


Hx Respiratory Disorders: No





- NEUROLOGICAL


Hx Neurological Disorder: No





- HEENT


Hx HEENT Problems: No


Other/Comment: Reading glasses





- RENAL


Hx Chronic Kidney Disease: Yes





- ENDOCRINE/METABOLIC


Hx Endocrine Disorders: Yes


Hx Diabetes Mellitus Type 2: Yes





- HEMATOLOGICAL/ONCOLOGICAL


Hx Anemia: Yes





- INTEGUMENTARY


Hx Dermatological Problems: No





- MUSCULOSKELETAL/RHEUMATOLOGICAL


Hx Arthritis: Yes





- GASTROINTESTINAL


Hx Gastrointestinal Disorders: Yes


Hx Colostomy: Yes (left , colon cancer)


Other/Comment: Hx of colon cancer





- GENITOURINARY/GYNECOLOGICAL


Hx Genitourinary Disorders: Yes


Hx Prostate Problems: Yes


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





- PSYCHIATRIC


Hx Substance Use: No





- SURGICAL HISTORY


Hx Surgeries: Yes


Other/Comment: COLON RESECTION 2001 WITH COLOSTOMY; COLOSTOMY REVISION 2007





- ANESTHESIA


Hx Anesthesia: Yes


Hx Anesthesia Reactions: No


Hx Malignant Hyperthermia: No





Meds


Allergies/Adverse Reactions: 


 Allergies











Allergy/AdvReac Type Severity Reaction Status Date / Time


 


No Known Allergies Allergy   Verified 02/08/18 09:13














- Medications


Medications: 


 Current Medications





Acetaminophen (Tylenol 325mg Tab)  650 mg PO Q6 PRN


   PRN Reason: Fever >100.4 F


   Last Admin: 02/08/18 16:08 Dose:  650 mg


Clopidogrel Bisulfate (Plavix)  75 mg PO DAILY [facility]


   Last Admin: 02/09/18 10:26 Dose:  75 mg


Dextrose (Dextrose 50% Inj)  0 ml IV STAT PRN; Protocol


   PRN Reason: Hypoglycemia Protocol


Dextrose (Glutose 15)  0 gm PO ONCE PRN; Protocol


   PRN Reason: Hypoglycemia Protocol


Ferrous Sulfate (Feosol)  325 mg PO BID [facility]


Glucagon (Glucagen Diagnostic Kit)  0 mg IM STAT PRN; Protocol


   PRN Reason: Hypoglycemia Protocol


Heparin Sodium (Porcine) (Heparin)  5,000 units SC Q8 [facility]


   Last Admin: 02/09/18 13:23 Dose:  5,000 units


Sodium Chloride (Sodium Chloride 0.9%)  1,000 mls @ 80 mls/hr IV .[id number] [facility]


   Stop: 02/10/18 23:59


   Last Admin: 02/08/18 16:13 Dose:  80 mls/hr


Meropenem 500 mg/ Sodium (Chloride)  100 mls @ 100 mls/hr IVPB Q12H [facility]


   Last Admin: 02/09/18 06:41 Dose:  100 mls/hr


Dextrose (Dextrose 5% In Water 1000 Ml)  1,000 mls @ 0 mls/hr IV .Q0M PRN; 

Protocol; Per Protocol


   PRN Reason: Hypoglycemia Protocol


Insulin Detemir (Levemir)  9 unit SC [facility]


   Last Admin: 02/08/18 21:20 Dose:  Not Given


Insulin Human Regular (Novolin R)  0 unit SC ACHS [facility]


   PRN Reason: Protocol


   Last Admin: 02/09/18 12:46 Dose:  1 unit


Multivitamins (Hexavitamin)  1 tab PO DAILY [facility]


   Last Admin: 02/09/18 10:26 Dose:  1 tab


Pantoprazole Sodium (Protonix Ec Tab)  40 mg PO DAILY [facility]


   Last Admin: 02/09/18 10:25 Dose:  40 mg


Rosuvastatin Calcium (Crestor)  10 mg PO HS [facility]


   Last Admin: 02/08/18 21:15 Dose:  10 mg


Sitagliptin Phosphate (Januvia)  25 mg PO DAILY [facility]


   Last Admin: 02/09/18 10:26 Dose:  Not Given


Tamsulosin HCl (Flomax)  0.4 mg PO DAILY [facility]


   Last Admin: 02/09/18 10:25 Dose:  0.4 mg











Physical Exam





- Constitutional


Appears: No Acute Distress, Chronically Ill





- Head Exam


Head Exam: ATRAUMATIC, NORMAL INSPECTION





- Eye Exam


Eye Exam: EOMI, Normal appearance





- Neck Exam


Neck exam: Positive for: Normal Inspection.  Negative for: Tenderness





- Respiratory Exam


Respiratory Exam: Clear to Auscultation Bilateral, NORMAL BREATHING PATTERN





- Cardiovascular Exam


Cardiovascular Exam: REGULAR RHYTHM, +S1





- GI/Abdominal Exam


GI & Abdominal Exam: Normal Bowel Sounds.  absent: Tenderness





- Extremities Exam


Extremities exam: Positive for: normal inspection.  Negative for: tenderness





- Neurological Exam


Neurological exam: Alert, CN II-XII Intact





- Skin


Skin Exam: Dry, Warm





Results





- Vital Signs


Recent Vital Signs: 


 Last Vital Signs











Temp  99.9 F H  02/09/18 08:22


 


Pulse  94 H  02/09/18 08:22


 


Resp  20   02/09/18 08:22


 


BP  120/64   02/09/18 08:22


 


Pulse Ox  96   02/09/18 08:22














- Labs


Result Diagrams: 


 02/09/18 07:40





 02/09/18 07:42


Labs: 


 Laboratory Results - last 24 hr











  02/08/18 02/08/18 02/08/18





  17:51 19:07 19:40


 


WBC   


 


RBC   


 


Hgb   


 


Hct   


 


MCV   


 


MCH   


 


MCHC   


 


RDW   


 


Plt Count   


 


MPV   


 


Neut % (Auto)   


 


Lymph % (Auto)   


 


Mono % (Auto)   


 


Eos % (Auto)   


 


Baso % (Auto)   


 


Neut # (Auto)   


 


Lymph # (Auto)   


 


Mono # (Auto)   


 


Eos # (Auto)   


 


Baso # (Auto)   


 


Neutrophils % (Manual)   


 


Band Neutrophils %   


 


Lymphocytes % (Manual)   


 


Monocytes % (Manual)   


 


Eosinophils % (Manual)   


 


Platelet Estimate   


 


Hypochromasia (manual)   


 


Anisocytosis (manual)   


 


Microcytosis (manual)   


 


Target Cells   


 


Tear Drop Cells   


 


Ovalocytes   


 


Jaycee Cells   


 


Sodium   


 


Potassium   


 


Chloride   


 


Carbon Dioxide   


 


Anion Gap   


 


BUN   


 


Creatinine   


 


Est GFR ( Amer)   


 


Est GFR (Non-Af Amer)   


 


POC Glucose (mg/dL)  73  51 L  82


 


Random Glucose   


 


Calcium   


 


Phosphorus   


 


Magnesium   


 


Total Bilirubin   


 


AST   


 


ALT   


 


Alkaline Phosphatase   


 


Total Protein   


 


Albumin   


 


Globulin   


 


Albumin/Globulin Ratio   














  02/08/18 02/09/18 02/09/18





  21:19 06:22 06:24


 


WBC   


 


RBC   


 


Hgb   


 


Hct   


 


MCV   


 


MCH   


 


MCHC   


 


RDW   


 


Plt Count   


 


MPV   


 


Neut % (Auto)   


 


Lymph % (Auto)   


 


Mono % (Auto)   


 


Eos % (Auto)   


 


Baso % (Auto)   


 


Neut # (Auto)   


 


Lymph # (Auto)   


 


Mono # (Auto)   


 


Eos # (Auto)   


 


Baso # (Auto)   


 


Neutrophils % (Manual)   


 


Band Neutrophils %   


 


Lymphocytes % (Manual)   


 


Monocytes % (Manual)   


 


Eosinophils % (Manual)   


 


Platelet Estimate   


 


Hypochromasia (manual)   


 


Anisocytosis (manual)   


 


Microcytosis (manual)   


 


Target Cells   


 


Tear Drop Cells   


 


Ovalocytes   


 


Jaycee Cells   


 


Sodium   


 


Potassium   


 


Chloride   


 


Carbon Dioxide   


 


Anion Gap   


 


BUN   


 


Creatinine   


 


Est GFR ( Amer)   


 


Est GFR (Non-Af Amer)   


 


POC Glucose (mg/dL)  99  63 L  62 L


 


Random Glucose   


 


Calcium   


 


Phosphorus   


 


Magnesium   


 


Total Bilirubin   


 


AST   


 


ALT   


 


Alkaline Phosphatase   


 


Total Protein   


 


Albumin   


 


Globulin   


 


Albumin/Globulin Ratio   














  02/09/18 02/09/18 02/09/18





  07:03 07:40 07:40


 


WBC   11.5 H 


 


RBC   3.76 L 


 


Hgb   7.9 L 


 


Hct   24.5 L 


 


MCV   65.3 L 


 


MCH   20.9 L 


 


MCHC   32.1 L 


 


RDW   18.1 H 


 


Plt Count   185 


 


MPV   7.3 


 


Neut % (Auto)   81.6 H 


 


Lymph % (Auto)   9.4 L 


 


Mono % (Auto)   8.0 


 


Eos % (Auto)   0.8 


 


Baso % (Auto)   0.2 


 


Neut # (Auto)   9.4 H 


 


Lymph # (Auto)   1.1 


 


Mono # (Auto)   0.9 H 


 


Eos # (Auto)   0.1 


 


Baso # (Auto)   0.0 


 


Neutrophils % (Manual)   79 H 


 


Band Neutrophils %   1 


 


Lymphocytes % (Manual)   13 L 


 


Monocytes % (Manual)   6 


 


Eosinophils % (Manual)   1 


 


Platelet Estimate   Normal 


 


Hypochromasia (manual)   Slight 


 


Anisocytosis (manual)   Slight 


 


Microcytosis (manual)   Slight 


 


Target Cells   Slight 


 


Tear Drop Cells   Slight 


 


Ovalocytes   Slight 


 


Hazelton Cells   Slight 


 


Sodium   


 


Potassium   


 


Chloride   


 


Carbon Dioxide   


 


Anion Gap   


 


BUN   


 


Creatinine   


 


Est GFR ( Amer)   


 


Est GFR (Non-Af Amer)   


 


POC Glucose (mg/dL)  61 L   95


 


Random Glucose   


 


Calcium   


 


Phosphorus   


 


Magnesium   


 


Total Bilirubin   


 


AST   


 


ALT   


 


Alkaline Phosphatase   


 


Total Protein   


 


Albumin   


 


Globulin   


 


Albumin/Globulin Ratio   














  02/09/18 02/09/18





  07:42 10:54


 


WBC  


 


RBC  


 


Hgb  


 


Hct  


 


MCV  


 


MCH  


 


MCHC  


 


RDW  


 


Plt Count  


 


MPV  


 


Neut % (Auto)  


 


Lymph % (Auto)  


 


Mono % (Auto)  


 


Eos % (Auto)  


 


Baso % (Auto)  


 


Neut # (Auto)  


 


Lymph # (Auto)  


 


Mono # (Auto)  


 


Eos # (Auto)  


 


Baso # (Auto)  


 


Neutrophils % (Manual)  


 


Band Neutrophils %  


 


Lymphocytes % (Manual)  


 


Monocytes % (Manual)  


 


Eosinophils % (Manual)  


 


Platelet Estimate  


 


Hypochromasia (manual)  


 


Anisocytosis (manual)  


 


Microcytosis (manual)  


 


Target Cells  


 


Tear Drop Cells  


 


Ovalocytes  


 


Hazelton Cells  


 


Sodium  138 


 


Potassium  5.1 


 


Chloride  109 H 


 


Carbon Dioxide  17 L 


 


Anion Gap  16 


 


BUN  44 H 


 


Creatinine  2.7 H 


 


Est GFR ( Amer)  27 


 


Est GFR (Non-Af Amer)  23 


 


POC Glucose (mg/dL)   186 H


 


Random Glucose  88 


 


Calcium  8.4 L 


 


Phosphorus  3.4 


 


Magnesium  1.6 


 


Total Bilirubin  1.0 


 


AST  31 


 


ALT  30 


 


Alkaline Phosphatase  94 


 


Total Protein  7.1 


 


Albumin  3.4 L 


 


Globulin  3.7 


 


Albumin/Globulin Ratio  0.9 L 














Assessment & Plan


(1) TEN (acute kidney injury)


Status: Acute   





(2) Acute pyelonephritis


Status: Acute   





(3) CKD (chronic kidney disease) stage 4, GFR 15-29 ml/min


Status: Acute   





(4) Proteinuria


Status: Acute   





(5) Proteinuria due to type 2 diabetes mellitus


Status: Acute   





- Assessment and Plan (Free Text)


Plan: 





IV fluids- continue


IV ABs


Add na bicarb


check proteinuria


serial chemistries

## 2018-02-09 NOTE — CP.PCM.PN
Subjective





- Date & Time of Evaluation


Date of Evaluation: 02/09/18


Time of Evaluation: 06:00





- Subjective


Subjective: 





dictated





Objective





- Vital Signs/Intake and Output


Vital Signs (last 24 hours): 


 











Temp Pulse Resp BP Pulse Ox


 


 98.7 F   97 H  20   154/71 H  98 


 


 02/09/18 17:12  02/09/18 17:12  02/09/18 17:12  02/09/18 17:12  02/09/18 17:12











- Medications


Medications: 


 Current Medications





Acetaminophen (Tylenol 325mg Tab)  650 mg PO Q6 PRN


   PRN Reason: Fever >100.4 F


   Last Admin: 02/08/18 16:08 Dose:  650 mg


Clopidogrel Bisulfate (Plavix)  75 mg PO DAILY [facility]


   Last Admin: 02/09/18 10:26 Dose:  75 mg


Dextrose (Dextrose 50% Inj)  0 ml IV STAT PRN; Protocol


   PRN Reason: Hypoglycemia Protocol


Dextrose (Glutose 15)  0 gm PO ONCE PRN; Protocol


   PRN Reason: Hypoglycemia Protocol


Ferrous Sulfate (Feosol)  325 mg PO BID [facility]


   Last Admin: 02/09/18 18:28 Dose:  325 mg


Glucagon (Glucagen Diagnostic Kit)  0 mg IM STAT PRN; Protocol


   PRN Reason: Hypoglycemia Protocol


Heparin Sodium (Porcine) (Heparin)  5,000 units SC Q8 [facility]


   Last Admin: 02/09/18 13:23 Dose:  5,000 units


Sodium Chloride (Sodium Chloride 0.9%)  1,000 mls @ 80 mls/hr IV .[id number] [facility]


   Stop: 02/10/18 23:59


   Last Admin: 02/08/18 16:13 Dose:  80 mls/hr


Meropenem 500 mg/ Sodium (Chloride)  100 mls @ 100 mls/hr IVPB Q12H [facility]


   Last Admin: 02/09/18 18:55 Dose:  100 mls/hr


Dextrose (Dextrose 5% In Water 1000 Ml)  1,000 mls @ 0 mls/hr IV .Q0M PRN; 

Protocol; Per Protocol


   PRN Reason: Hypoglycemia Protocol


Insulin Detemir (Levemir)  9 unit SC HS [facility]


   Last Admin: 02/08/18 21:20 Dose:  Not Given


Insulin Human Regular (Novolin R)  0 unit SC ACHS [facility]


   PRN Reason: Protocol


   Last Admin: 02/09/18 18:27 Dose:  2 unit


Multivitamins (Hexavitamin)  1 tab PO DAILY [facility]


   Last Admin: 02/09/18 10:26 Dose:  1 tab


Pantoprazole Sodium (Protonix Ec Tab)  40 mg PO DAILY [facility]


   Last Admin: 02/09/18 10:25 Dose:  40 mg


Rosuvastatin Calcium (Crestor)  10 mg PO HS [facility]


   Last Admin: 02/08/18 21:15 Dose:  10 mg


Saccharomyces Boulardii (Florastor)  250 mg PO BID [facility]


   Last Admin: 02/09/18 18:28 Dose:  250 mg


Sodium Bicarbonate (Sodium Bicarbonate Tab)  650 mg PO BID [facility]


   Last Admin: 02/09/18 18:28 Dose:  650 mg


Tamsulosin HCl (Flomax)  0.4 mg PO DAILY [facility]


   Last Admin: 02/09/18 10:25 Dose:  0.4 mg











- Labs


Labs: 


 





 02/09/18 07:40 





 02/09/18 07:42

## 2018-02-09 NOTE — PN
DATE:  02/09/2018



SUBJECTIVE:  He was feeling a lot better today.  He was not shaking with

chills.  He denies dysuria.  He is more comfortable.  He was not having any

chills.  Yesterday when I saw him, he was having chills and he needed extra

blanket.  His family was at the bedside and he felt a lot better.  He was

on IV fluids.



PHYSICAL EXAMINATION:

VITAL SIGNS:  T-max today is 99.1, pulse of 97, blood pressure 154/71,

respirations are 20.

HEENT:  Head is atraumatic and normocephalic.

NECK:  Supple.

LUNGS:  Clear.  No crackles or rales present.

HEART:  S1 and S2 are regular.

ABDOMEN:  Soft and nontender.  No guarding, no rigidity present.  No CVA

tenderness present.  He has a colostomy which is functioning.

EXTREMITIES:  Have no edema at this time.



LABORATORY DATA:  Labs are noted.  Labs show; white count is 11.5,

hemoglobin 7.9, hematocrit 24.5, and platelet count is 185.  He remains

anemic.  Sodium is 138, potassium 5.1, chloride 109, CO2 17, BUN 16,

creatinine 2.7, is coming down.  Glucose is 207.  UA showed wbc 10, rbc 1. 

His blood cultures are negative 1 day and urine culture shows gram-negative

rods.  His CAT scan was done yesterday and CAT scan shows right perinephric

stranding, moderate hydroureteronephrosis without obstructing calculus

evident, point of obstruction of distal ureter appears likely near course,

vascular calcification in the right pelvis.  The left kidney is absent by

history.  There is a large left peristomal herniation containing small and

large bowel loops, left-sided colostomy, status post distal large bowel

resection, moderate sized right inguinal hernia containing loops of small

bowel without evidence of obstruction.  He also has a right inguinal hernia

at this time without evidence of obstruction and previously demonstrated

mass like density within the midline lower abdomen not appreciated.  He

also has cholelithiasis.



ASSESSMENT AND PLAN:  At this time, he has urinary tract infection, he has

right perinephric stranding with _____ moderate right

hydroureteronephrosis.  Suggested at this time to continue IV antibiotics

and to check with renal if Urology evaluation would help at this time.  We

will continue IV fluids as his creatinine is also high and his lactate

level I wanted to check yesterday, I do not see it even though I ordered

it.  He is clinically improving, so we will continue present antibiotics.







__________________________________________

Anil Benitez MD





DD:  02/09/2018 21:03:25

DT:  02/09/2018 22:16:14

Job # [id number]

## 2018-02-09 NOTE — CON
DATE:



INFECTIOUS DISEASE CONSULT



REQUESTED BY:  Jayson Jones DO



HISTORY OF PRESENT ILLNESS:  _____.  He follows with Dr. Jessee Benitez. 

He is an 85-year-old with history of rectal cancer, has a colostomy, had a

renal dissection and has history of incontinence, and he also has only one

kidney since the other one is nonfunctioning, has history of hypertension

and diabetes mellitus, was having fevers for two days and started to have

dysuria and irritation.  Right now, he is having chills and he is in the

hallway, admitted to Dr. Jayson Jones's service and complaining of chills and

fever, and he was having back pain a month ago he says and he had an MRI

and x-ray.  I am not sure about those reports.  He denies any cough or

cold.  He denies any joint pain, but  he is shivering with chills at this

time.  Denies any nausea and vomiting.  He says he has probably urinary

infection that he has had every year and he follows with Dr. Seo, as

well as with Dr. Benitez and he is here with fevers.  In ER, he had 101.4

earlier and pulse is 90, respirations 18, blood pressure is 90/50, and

pulse oximetry is 100%.



PAST MEDICAL HISTORY:  Significant for arthritis, congestive heart failure,

diabetes, hypertension, hypercholesterolemia, hyperlipidemia, chronic

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  He had a left

kidney resection in the past and had colectomy in 2001 and hernia repair in

2007.



SOCIAL HISTORY:  Negative for smoking, drinking, or any drug abuse.



FAMILY HISTORY:  Noncontributory.



PAST SURGICAL HISTORY:  He has surgeries as above.



REVIEW OF SYSTEMS:  Comes in with fever, chills, and has no nausea, no

vomiting.  He does complain of dysuria.  Denies any hematuria.  Denies any

abdominal pain.  He does suffer from back pain.  He has no skin rash.  No

neurological problems.  He is saying he has urinary infection and he

follows with Dr. Hooks.



PHYSICAL EXAMINATION:

VITAL SIGNS:  On examination, I find his temperature is 101.4, blood

pressure is 90/55, respirations are 18, and pulse is 90.

GENERAL:  He is alert and oriented.

HEENT:  Head is atraumatic and normocephalic.  Pupils are reacting to

light.  Eye movements are unremarkable.  Tongue is dry.

NECK:  Supple.  JVP is flat.

LUNGS:  Clear.  No crackles or rales present.

HEART:  S1 and S2 are regular.

ABDOMEN:  Soft and nontender.  No guarding.  No rigidity present. 

Colostomy is functioning.

EXTREMITIES:  Have no edema, clubbing, or cyanosis.



LABORATORY DATA:  Labs are noted.  Labs show white count is 11.9,

hemoglobin 7.9, hematocrit 24.4, and platelet count is 195, neutrophils is

75.6.  Sodium is 133, potassium is 5.4, chloride is 104, CO2 is 22, BUN is

44, creatinine is 3.0, so the creatinine is elevated markedly.  Urine shows

3+ leukocytes, wbc's 2005, wbc's many, and bacteria occasional.  Influenza

is negative for A and B.  Microbiology wise, blood culture and urine

cultures have been ordered.  He had a chest x-ray and a CT scan.  No active

disease.  Echogenic liver may be seen in the setting of hepatic parenchymal

disease or fatty infiltration.  The CAT scan shows gallstones.  No

gallbladder thickening.  The left kidney is not visualized, surgically

removed.  The right one has moderate right-sided hydronephrosis, no

obstructing calculus identified.



ASSESSMENT AND PLAN:  So, he has moderate right-sided hydronephrosis,

absent left kidney, and he may need a Urology evaluation, Dr. Seo has to

offer, and at this time I did place him on Merrem as well as Azactam as he

was shaking with chills and most probably febrile when I saw, he got one

dose of Azactam today.  We will follow, and the patient is also on

intravenous fluids.





__________________________________________

Anil Benitez MD



DD:  02/08/2018 18:46:22

DT:  02/09/2018 2:19:42

Job # [id number]

## 2018-02-09 NOTE — CP.PCM.PN
Subjective





- Date & Time of Evaluation


Date of Evaluation: 02/09/18


Time of Evaluation: 13:30





- Subjective


Subjective: 





Hospitalist Progress Note 





Patient was seen and examined at 1:30 PM 2/9/18





85 year old male (CKD Stage 4, HTN, DM 1, HLD, Rectal CA S/P Colostomy 2001, 

Congenital Absence of Left Kidney, Iron Deficiency Anemia, CVA) who was 

admitted for evaluation of burning and pain upon urination. He was found to 

have a UTI/Right Pyelopnephritis. 





Currently upon FULL ROS:


No longer having burning/pain with urination


NO chest pain


NO palpitations


NO SOB/Cough/Wheezing


NO dysphagia/odynophagia


NO abdominal pain


NO n/v/d/c


NO headaches


NO new changes in vision


NO new change in hearing


NO paresthesias





Exam:


General: AAOX3, NAD


HEENT: NCA, EOMI, PERRLA, NO cervical/supraclavicular/submandibular 

lymphadenopathy, NO pharyngeal erythema/exudate, Nasal Turbinates are 

nonerythematous/nonedematous, Oral Mucosa is moist


Cardio: NS1 and NS2, NO M/R/G


Resp: CTA B/L, NO R/R/W


GI: BSx4, Soft, NT, NO HSM, NO guarding/rebound tenderness. LLQ Colostomy Bag 

with brown/green stool


Ext: Pulses are strong and equal, Capillary Refill is 2 seconds, NO edema


Neuro: CN II through XII are grossly intact





Assessment and Plan:





1). UTI/Pyelonephritis


CT Abdomen/Pelvis shows Right Perinephric Stranding with moderate 

hydroureternephrosis without calculus. Moderate size Right Inguinal Hernia 

containing loops of bowel without obstruction


Meropenem 500 mg IV Q12H


Urine Culture shows Gram Negative Rods. F/U Sensitivities


Blood Culture is NGTD


ID Dr. SANJANA Benitez





2). Chronic Kidney Failure (CKD Stage 4)


Nephrology Dr. Seo: follows patient in outpatient setting and will follow up 

baseline renal numbers





3). Hx HTN


He is not on any medications at home





4). Hx DM 2 on Insulin


Levemir 9 Units SC HS: he is on 18 units at home but this was halved due to 

hypoglycemia


RISS ACHS


HOLDING Januvia 25 mg PO 1x/day and Glimepiride 4 mg PO 1x/day





5). Hx HLD


Crestor 10 mg PO HS





6). Hx Rectal CA S/P LLQ Colostomy 2001





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





8). Hx BPH


Flomax 0.4 mg PO 1x/day





9). Hx Questionable CVA


Patient could not provide details other than "Dr. Moseley told me that I had a 

small stroke 5 to 6 months ago and started me on Plavix"


Plavix 75 mg PO 1x/day





10). Hx Iron Deficiency Anemia


Ferrous Sulfate 325 mg PO 2x/day





11). Prophylaxis


Tylenol 650 mg PO Q6H PRN Fever


Heparin 5,000 Units SC Q8H


MVI


Protonix 40 mg PO 1x/day


NS at 80 ml/hr





Cornel Jarvis D.O.








Objective





- Vital Signs/Intake and Output


Vital Signs (last 24 hours): 


 











Temp Pulse Resp BP Pulse Ox


 


 98.7 F   97 H  20   154/71 H  98 


 


 02/09/18 17:12  02/09/18 17:12  02/09/18 17:12  02/09/18 17:12  02/09/18 17:12











- Medications


Medications: 


 Current Medications





Acetaminophen (Tylenol 325mg Tab)  650 mg PO Q6 PRN


   PRN Reason: Fever >100.4 F


   Last Admin: 02/08/18 16:08 Dose:  650 mg


Clopidogrel Bisulfate (Plavix)  75 mg PO DAILY [facility]


   Last Admin: 02/09/18 10:26 Dose:  75 mg


Dextrose (Dextrose 50% Inj)  0 ml IV STAT PRN; Protocol


   PRN Reason: Hypoglycemia Protocol


Dextrose (Glutose 15)  0 gm PO ONCE PRN; Protocol


   PRN Reason: Hypoglycemia Protocol


Ferrous Sulfate (Feosol)  325 mg PO BID [facility]


   Last Admin: 02/09/18 18:28 Dose:  325 mg


Glucagon (Glucagen Diagnostic Kit)  0 mg IM STAT PRN; Protocol


   PRN Reason: Hypoglycemia Protocol


Heparin Sodium (Porcine) (Heparin)  5,000 units SC Q8 [facility]


   Last Admin: 02/09/18 13:23 Dose:  5,000 units


Sodium Chloride (Sodium Chloride 0.9%)  1,000 mls @ 80 mls/hr IV .[id number] [facility]


   Stop: 02/10/18 23:59


   Last Admin: 02/08/18 16:13 Dose:  80 mls/hr


Meropenem 500 mg/ Sodium (Chloride)  100 mls @ 100 mls/hr IVPB Q12H [facility]


   Last Admin: 02/09/18 18:55 Dose:  100 mls/hr


Dextrose (Dextrose 5% In Water 1000 Ml)  1,000 mls @ 0 mls/hr IV .Q0M PRN; 

Protocol; Per Protocol


   PRN Reason: Hypoglycemia Protocol


Insulin Detemir (Levemir)  9 unit SC [facility]


   Last Admin: 02/08/18 21:20 Dose:  Not Given


Insulin Human Regular (Novolin R)  [facility]


   PRN Reason: Protocol


   Last Admin: 02/09/18 18:27 Dose:  2 unit


Multivitamins (Hexavitamin)  1 tab PO DAILY [facility]


   Last Admin: 02/09/18 10:26 Dose:  1 tab


Pantoprazole Sodium (Protonix Ec Tab)  40 mg PO DAILY [facility]


   Last Admin: 02/09/18 10:25 Dose:  40 mg


Rosuvastatin Calcium (Crestor)  10 mg PO [facility]


   Last Admin: 02/08/18 21:15 Dose:  10 mg


Saccharomyces Boulardii (Florastor)  250 mg PO BID [facility]


   Last Admin: 02/09/18 18:28 Dose:  250 mg


Sodium Bicarbonate (Sodium Bicarbonate Tab)  650 mg PO BID [facility]


   Last Admin: 02/09/18 18:28 Dose:  650 mg


Tamsulosin HCl (Flomax)  0.4 mg PO DAILY [facility]


   Last Admin: 02/09/18 10:25 Dose:  0.4 mg











- Labs


Labs: 


 





 02/09/18 07:40 





 02/09/18 07:42

## 2018-02-10 LAB
ALBUMIN SERPL-MCNC: 3.2 G/DL (ref 3.5–5)
ALBUMIN/GLOB SERPL: 1 {RATIO} (ref 1–2.1)
ALT SERPL-CCNC: 54 U/L (ref 21–72)
AST SERPL-CCNC: 57 U/L (ref 17–59)
BASOPHILS # BLD AUTO: 0 K/UL (ref 0–0.2)
BASOPHILS NFR BLD: 0.4 % (ref 0–2)
BUN SERPL-MCNC: 36 MG/DL (ref 9–20)
CALCIUM SERPL-MCNC: 8.4 MG/DL (ref 8.6–10.4)
COLLECT DURATION TIME UR: 24 HRS
EOSINOPHIL # BLD AUTO: 0.2 K/UL (ref 0–0.7)
EOSINOPHIL NFR BLD: 2.8 % (ref 0–4)
ERYTHROCYTE [DISTWIDTH] IN BLOOD BY AUTOMATED COUNT: 18 % (ref 11.5–14.5)
GFR NON-AFRICAN AMERICAN: 30
HGB BLD-MCNC: 7.3 G/DL (ref 12–18)
LYMPHOCYTES # BLD AUTO: 1.3 K/UL (ref 1–4.3)
LYMPHOCYTES NFR BLD AUTO: 16.2 % (ref 20–40)
MAGNESIUM SERPL-MCNC: 1.5 MG/DL (ref 1.6–2.3)
MCH RBC QN AUTO: 21.3 PG (ref 27–31)
MCHC RBC AUTO-ENTMCNC: 33.1 G/DL (ref 33–37)
MCV RBC AUTO: 64.4 FL (ref 80–94)
MONOCYTES # BLD: 0.8 K/UL (ref 0–0.8)
MONOCYTES NFR BLD: 9.3 % (ref 0–10)
NEUTROPHILS # BLD: 5.8 K/UL (ref 1.8–7)
NEUTROPHILS NFR BLD AUTO: 71.3 % (ref 50–75)
NRBC BLD AUTO-RTO: 0 % (ref 0–2)
PLATELET # BLD: 181 K/UL (ref 130–400)
PMV BLD AUTO: 7.7 FL (ref 7.2–11.7)
RBC # BLD AUTO: 3.41 MIL/UL (ref 4.4–5.9)
URINE 24 HOUR TOTAL PROTEIN: 216 MG/24HR (ref 42–225)
URINE TOTAL VOLUME: 900 ML
WBC # BLD AUTO: 8.2 K/UL (ref 4.8–10.8)

## 2018-02-10 RX ADMIN — Medication SCH MG: at 17:09

## 2018-02-10 RX ADMIN — PANTOPRAZOLE SODIUM SCH MG: 40 TABLET, DELAYED RELEASE ORAL at 09:36

## 2018-02-10 RX ADMIN — INSULIN DETEMIR SCH UNIT: 100 INJECTION, SOLUTION SUBCUTANEOUS at 21:40

## 2018-02-10 RX ADMIN — Medication SCH MG: at 09:35

## 2018-02-10 RX ADMIN — Medication SCH TAB: at 09:35

## 2018-02-10 RX ADMIN — MAGNESIUM SULFATE IN DEXTROSE SCH MLS/HR: 10 INJECTION, SOLUTION INTRAVENOUS at 13:06

## 2018-02-10 RX ADMIN — HUMAN INSULIN SCH UNIT: 100 INJECTION, SOLUTION SUBCUTANEOUS at 12:30

## 2018-02-10 RX ADMIN — HUMAN INSULIN SCH: 100 INJECTION, SOLUTION SUBCUTANEOUS at 07:19

## 2018-02-10 RX ADMIN — HUMAN INSULIN SCH UNIT: 100 INJECTION, SOLUTION SUBCUTANEOUS at 21:40

## 2018-02-10 RX ADMIN — MAGNESIUM SULFATE IN DEXTROSE SCH MLS/HR: 10 INJECTION, SOLUTION INTRAVENOUS at 14:15

## 2018-02-10 RX ADMIN — HUMAN INSULIN SCH UNIT: 100 INJECTION, SOLUTION SUBCUTANEOUS at 17:08

## 2018-02-10 NOTE — CP.PCM.PN
Subjective





- Date & Time of Evaluation


Date of Evaluation: 02/10/18


Time of Evaluation: 10:55





- Subjective


Subjective: 





pt seen and examined


in bed, feels ok


dysuria resolved


afebrile


on iv fluids 


no SOB or chest pain


labs noted- Cr down to 2.1





ROS- as per HPI, other than that 10 point ROS negative 





Objective





- Vital Signs/Intake and Output


Vital Signs (last 24 hours): 


 











Temp Pulse Resp BP Pulse Ox


 


 98.5 F   85   20   123/69   96 


 


 02/10/18 07:35  02/10/18 07:35  02/10/18 07:35  02/10/18 07:35  02/10/18 07:35








Intake and Output: 


 











 02/10/18 02/10/18





 06:59 18:59


 


Intake Total 1060 


 


Balance 1060 














- Medications


Medications: 


 Current Medications





Acetaminophen (Tylenol 325mg Tab)  650 mg PO Q6 PRN


   PRN Reason: Fever >100.4 F


   Last Admin: 02/08/18 16:08 Dose:  650 mg


Clopidogrel Bisulfate (Plavix)  75 mg PO DAILY [facility]


   Last Admin: 02/10/18 09:35 Dose:  75 mg


Dextrose (Dextrose 50% Inj)  0 ml IV STAT PRN; Protocol


   PRN Reason: Hypoglycemia Protocol


Dextrose (Glutose 15)  0 gm PO ONCE PRN; Protocol


   PRN Reason: Hypoglycemia Protocol


Ferrous Sulfate (Feosol)  325 mg PO BID [facility]


   Last Admin: 02/10/18 09:35 Dose:  325 mg


Glucagon (Glucagen Diagnostic Kit)  0 mg IM STAT PRN; Protocol


   PRN Reason: Hypoglycemia Protocol


Sodium Chloride (Sodium Chloride 0.9%)  1,000 mls @ 80 mls/hr IV .[id number] [facility]


   Stop: 02/10/18 23:59


   Last Admin: 02/10/18 05:30 Dose:  80 mls/hr


Meropenem 500 mg/ Sodium (Chloride)  100 mls @ 100 mls/hr IVPB Q12H [facility]


   Last Admin: 02/10/18 05:50 Dose:  100 mls/hr


Dextrose (Dextrose 5% In Water 1000 Ml)  1,000 mls @ 0 mls/hr IV .Q0M PRN; 

Protocol; Per Protocol


   PRN Reason: Hypoglycemia Protocol


Magnesium Sulfate/Dextrose (Magnesium Sulfate 1 Gm/100 Ml D5w)  1 gm in 100 mls 

@ 100 mls/hr IVPB Q1H [facility]


   Stop: 02/10/18 12:44


Insulin Detemir (Levemir)  9 unit SC [facility]


   Last Admin: 02/09/18 22:09 Dose:  9 unit


Insulin Human [facility] ([facility] R)  [facility]


   PRN Reason: Protocol


   Last Admin: 02/10/18 07:19 Dose:  Not Given


Multivitamins (Hexavitamin)  1 tab PO DAILY [facility]


   Last Admin: 02/10/18 09:35 Dose:  1 tab


Pantoprazole Sodium (Protonix Ec Tab)  40 mg PO DAILY [facility]


   Last Admin: 02/10/18 09:36 Dose:  40 mg


Rosuvastatin Calcium (Crestor)  10 mg PO HS [facility]


   Last Admin: 02/09/18 22:08 Dose:  10 mg


Saccharomyces Boulardii (Florastor)  250 mg PO BID [facility]


   Last Admin: 02/10/18 09:35 Dose:  250 mg


Sodium Bicarbonate (Sodium Bicarbonate Tab)  650 mg PO BID [facility]


   Last Admin: 02/10/18 09:35 Dose:  650 mg


Tamsulosin HCl (Flomax)  0.4 mg PO DAILY [facility]


   Last Admin: 02/10/18 09:35 Dose:  0.4 mg











- Labs


Labs: 


 





 02/10/18 07:10 





 02/10/18 07:10 











- Constitutional


Appears: Well, Non-toxic





- Head Exam


Head Exam: ATRAUMATIC, NORMOCEPHALIC





- Eye Exam


Eye Exam: EOMI, PERRL





- ENT Exam


ENT Exam: Mucous Membranes Moist





- Neck Exam


Neck Exam: Full ROM





- Respiratory Exam


Respiratory Exam: Clear to Ausculation Bilateral.  absent: Rhonchi, Wheezes


Additional comments: 





decreased BS at bases 





- Cardiovascular Exam


Cardiovascular Exam: REGULAR RHYTHM, +S1, +S2





- GI/Abdominal Exam


GI & Abdominal Exam: Soft.  absent: Distended, Tenderness





- Extremities Exam


Extremities Exam: absent: Joint Swelling, Pedal Edema





- Neurological Exam


Neurological Exam: Alert, Awake, Oriented x3





- Psychiatric Exam


Psychiatric exam: Normal Affect, Normal Mood





- Skin


Skin Exam: Dry, Intact





Assessment and Plan


(1) RAMAN (acute kidney injury)


Status: Acute   





(2) Acute pyelonephritis


Status: Acute   





(3) CKD (chronic kidney disease) stage 4, GFR 15-29 ml/min


Status: Acute   





(4) Proteinuria due to type 2 diabetes mellitus


Status: Acute   





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


Status: Acute   





(6) History of colon cancer


Status: Chronic   





- Assessment and Plan (Free Text)


Plan: 





Raman resolving with fluids and treatment of UTI


cr down to 2.1( baseline 1.5)


BP controlled


decrease iv fluids

## 2018-02-10 NOTE — CP.PCM.PN
<Ariel Yao - Last Filed: 02/10/18 10:31>





Subjective





- Date & Time of Evaluation


Date of Evaluation: 02/10/18


Time of Evaluation: 10:31





- Subjective


Subjective: 





Medicine progress note for Dr. KOFFI Jarvis





Patient seen and examined at bedside. Patient reports that he is not 

experiencing burning with urination at this time. Denies fever, chills, chest 

pain, dyspnea, abdominal pain.





Objective





- Vital Signs/Intake and Output


Vital Signs (last 24 hours): 


 











Temp Pulse Resp BP Pulse Ox


 


 98.5 F   85   20   123/69   96 


 


 02/10/18 07:35  02/10/18 07:35  02/10/18 07:35  02/10/18 07:35  02/10/18 07:35








Intake and Output: 


 











 02/10/18 02/10/18





 06:59 18:59


 


Intake Total 1060 


 


Balance 1060 














- Medications


Medications: 


 Current Medications





Acetaminophen (Tylenol 325mg Tab)  650 mg PO Q6 PRN


   PRN Reason: Fever >100.4 F


   Last Admin: 02/08/18 16:08 Dose:  650 mg


Clopidogrel Bisulfate (Plavix)  75 mg PO DAILY [facility]


   Last Admin: 02/10/18 09:35 Dose:  75 mg


Dextrose (Dextrose 50% Inj)  0 ml IV STAT PRN; Protocol


   PRN Reason: Hypoglycemia Protocol


Dextrose (Glutose 15)  0 gm PO ONCE PRN; Protocol


   PRN Reason: Hypoglycemia Protocol


Ferrous Sulfate (Feosol)  325 mg PO BID [facility]


   Last Admin: 02/10/18 09:35 Dose:  325 mg


Glucagon (Glucagen Diagnostic Kit)  0 mg IM STAT PRN; Protocol


   PRN Reason: Hypoglycemia Protocol


Heparin Sodium (Porcine) (Heparin)  5,000 units SC Q8 [facility]


   Last Admin: 02/10/18 05:50 Dose:  5,000 units


Sodium Chloride (Sodium Chloride 0.9%)  1,000 mls @ 80 mls/hr IV .[id number] [facility]


   Stop: 02/10/18 23:59


   Last Admin: 02/10/18 05:30 Dose:  80 mls/hr


Meropenem 500 mg/ Sodium (Chloride)  100 mls @ 100 mls/hr IVPB Q12H [facility]


   Last Admin: 02/10/18 05:50 Dose:  100 mls/hr


Dextrose (Dextrose 5% In Water 1000 Ml)  1,000 mls @ 0 mls/hr IV .Q0M PRN; 

Protocol; Per Protocol


   PRN Reason: Hypoglycemia Protocol


Insulin Detemir (Levemir)  9 unit SC HS [facility]


   Last Admin: 02/09/18 22:09 Dose:  9 unit


Insulin Human Regular (Novolin R)  0 unit SC [facility]


   PRN Reason: Protocol


   Last Admin: 02/10/18 07:19 Dose:  Not Given


Multivitamins (Hexavitamin)  1 tab PO DAILY [facility]


   Last Admin: 02/10/18 09:35 Dose:  1 tab


Pantoprazole Sodium (Protonix Ec Tab)  40 mg PO DAILY [facility]


   Last Admin: 02/10/18 09:36 Dose:  40 mg


Rosuvastatin Calcium (Crestor)  10 mg PO HS [facility]


   Last Admin: 02/09/18 22:08 Dose:  10 mg


Saccharomyces Boulardii (Florastor)  250 mg PO BID [facility]


   Last Admin: 02/10/18 09:35 Dose:  250 mg


Sodium Bicarbonate (Sodium Bicarbonate Tab)  650 mg PO BID [facility]


   Last Admin: 02/10/18 09:35 Dose:  650 mg


Tamsulosin HCl (Flomax)  0.4 mg PO DAILY [facility]


   Last Admin: 02/10/18 09:35 Dose:  0.4 mg











- Labs


Labs: 


 





 02/10/18 07:10 





 02/10/18 07:10 











- Additional Findings


Additional findings: 





- Constitutional


Appears: No Acute Distress





- Head Exam


Head Exam: ATRAUMATIC, NORMOCEPHALIC





- Eye Exam


Eye Exam: EOMI, Normal appearance





- ENT Exam


ENT Exam: Mucous Membranes Moist





- Respiratory Exam


Respiratory Exam: Clear to Auscultation Bilateral, NORMAL BREATHING PATTERN.  

absent: Rales, Rhonchi, Wheezes





- Cardiovascular Exam


Cardiovascular Exam: REGULAR RHYTHM, +S1, +S2





- GI/Abdominal Exam


GI & Abdominal Exam: Normal Bowel Sounds, Soft.  absent: Tenderness


Additional comments: 





Left sided colostomy with no evidence of infection in the surrounding regions





- Extremities Exam


Extremities exam: Positive for: pedal pulses present.  Negative for: pedal edema

, tenderness





- Neurological Exam


Neurological exam: Alert, CN II-XII Intact, Oriented x3





- Psychiatric Exam


Psychiatric exam: Normal Affect, Normal Mood





- Skin


Skin Exam: Dry, Warm


Additional comments: 





Surgical scar in the center of the lower abdomen. Colostomy on the left lower 

abdomen with no evidence of infection in the surrounding area.








Assessment and Plan





- Assessment and Plan (Free Text)


Plan: 





UTI/Pyelonephritis


+3 leukocyte esterase


Urine cultures grew Proteus Mirabilis


ID consult Dr. Benitez, help appreciated


CT abdomen/pelvis without contrast shows Right Perinephric Stranding with 

moderate hydroureternephrosis without calculus. Moderate size Right Inguinal 

Hernia containing loops of bowel without obstruction


Meropenem 500 mg IV Q12H


Abdominal ultrasound shows hydronephrosis


Urology consult Dr. VERO Manrique, help appreciated





Acute on Chronic Kidney Disease


Baseline creatinine 1.5


NS 80 cc/hr


Nephrology consult Dr. Seo, help appreciated


f/u 24 hour urine protein





History of Hypertension


Held home Ramipril due to acute kidney injury


Monitor BP





History of Diabetes


Previous hemoglobin A1c 7 in November 2017


f/u repeat A1c


Halved home Levemir 18 units to 9 units HS


Held home Januvia 25 mg PO daily


Regular ISS-low





History of Hyperlipidemia


Home Lipitor not on formulary. 


Crestor 10 mg PO HS





History of BPH


Flomax 0.4 mg PO 1x/day





History of Questionable CVA


Patient could not provide details other than "Dr. Moseley told me that I had a 

small stroke 5 to 6 months ago and started me on Plavix"


Plavix 75 mg PO daily





Prophylactic Measure


Tylenol 650 mg PO Q6 prn fever


Heparin 5000 units SC Q8


Protonix 40 mg PO daily


Diabetic diet


MVI





Case DW Dr. KOFFI Yao PGY-1





<Cornel Jarvis - Last Filed: 02/10/18 11:05>





Objective





- Vital Signs/Intake and Output


Vital Signs (last 24 hours): 


 











Temp Pulse Resp BP Pulse Ox


 


 98.5 F   85   20   123/69   96 


 


 02/10/18 07:35  02/10/18 07:35  02/10/18 07:35  02/10/18 07:35  02/10/18 07:35








Intake and Output: 


 











 02/10/18 02/10/18





 06:59 18:59


 


Intake Total 1060 


 


Balance 1060 














- Medications


Medications: 


 Current Medications





Acetaminophen (Tylenol 325mg Tab)  650 mg PO Q6 PRN


   PRN Reason: Fever >100.4 F


   Last Admin: 02/08/18 16:08 Dose:  650 mg


Clopidogrel Bisulfate (Plavix)  75 mg PO DAILY [facility]


   Last Admin: 02/10/18 09:35 Dose:  75 mg


Dextrose (Dextrose 50% Inj)  0 ml IV STAT PRN; Protocol


   PRN Reason: Hypoglycemia Protocol


Dextrose (Glutose 15)  0 gm PO ONCE PRN; Protocol


   PRN Reason: Hypoglycemia Protocol


Ferrous Sulfate (Feosol)  325 mg PO BID [facility]


   Last Admin: 02/10/18 09:35 Dose:  325 mg


Glucagon (Glucagen Diagnostic Kit)  0 mg IM STAT PRN; Protocol


   PRN Reason: Hypoglycemia Protocol


Sodium Chloride (Sodium Chloride 0.9%)  1,000 mls @ 80 mls/hr IV .[id number] [facility]


   Stop: 02/10/18 23:59


   Last Admin: 02/10/18 05:30 Dose:  80 mls/hr


Meropenem 500 mg/ Sodium (Chloride)  100 mls @ 100 mls/hr IVPB Q12H [facility]


   Last Admin: 02/10/18 05:50 Dose:  100 mls/hr


Dextrose (Dextrose 5% In Water 1000 Ml)  1,000 mls @ 0 mls/hr IV .Q0M PRN; 

Protocol; Per Protocol


   PRN Reason: Hypoglycemia Protocol


Magnesium Sulfate/Dextrose (Magnesium Sulfate 1 Gm/100 Ml D5w)  1 gm in 100 mls 

@ 100 mls/hr IVPB Q1H [facility]


   Stop: 02/10/18 12:44


Insulin Detemir (Levemir)  9 unit SC [facility]


   Last Admin: 02/09/18 22:09 Dose:  9 unit


Insulin Human Regular (Novolin R)  [facility]


   PRN Reason: Protocol


   Last Admin: 02/10/18 07:19 Dose:  Not Given


Multivitamins (Hexavitamin)  1 tab PO DAILY [facility]


   Last Admin: 02/10/18 09:35 Dose:  1 tab


Pantoprazole Sodium (Protonix Ec Tab)  40 mg PO DAILY [facility]


   Last Admin: 02/10/18 09:36 Dose:  40 mg


Rosuvastatin Calcium (Crestor)  10 mg PO HS [facility]


   Last Admin: 02/09/18 22:08 Dose:  10 mg


Saccharomyces Boulardii (Florastor)  250 mg PO BID [facility]


   Last Admin: 02/10/18 09:35 Dose:  250 mg


Sodium Bicarbonate (Sodium Bicarbonate Tab)  650 mg PO BID [facility]


   Last Admin: 02/10/18 09:35 Dose:  650 mg


Tamsulosin HCl (Flomax)  0.4 mg PO DAILY [facility]


   Last Admin: 02/10/18 09:35 Dose:  0.4 mg











- Labs


Labs: 


 





 02/10/18 07:10 





 02/10/18 07:10 











Attending/Attestation





- Attestation


I have personally seen and examined this patient.: Yes


I have fully participated in the care of the patient.: Yes


I have reviewed all pertinent clinical information, including history, physical 

exam and plan: Yes


Notes (Text): 





02/10/18 10:56


Hospitalist Progress Note 





Patient was seen and examined at 10:45 AM 2/10/18





85 year old male (CKD Stage 4, HTN, DM 1, HLD, Rectal CA S/P Colostomy 2001, 

Congenital Absence of Left Kidney, Iron Deficiency Anemia, CVA) who was 

admitted for evaluation of burning and pain upon urination. He was found to 

have a UTI/Right Pyelopnephritis. 





Currently upon FULL ROS:


No longer having burning/pain with urination


NO chest pain


NO palpitations


NO SOB/Cough/Wheezing


NO dysphagia/odynophagia


NO abdominal pain


NO n/v/d/c: he emptied his LLQ Colostomy bag this morning of well formed stool


NO headaches


NO new changes in vision


NO new change in hearing


NO paresthesias





Exam:


General: AAOX3, NAD


HEENT: NCA, EOMI, PERRLA, NO cervical/supraclavicular/submandibular 

lymphadenopathy, NO pharyngeal erythema/exudate, Nasal Turbinates are 

nonerythematous/nonedematous, Oral Mucosa is moist


Cardio: NS1 and NS2, NO M/R/G


Resp: CTA B/L, NO R/R/W


GI: BSx4, Soft, NT, NO HSM, NO guarding/rebound tenderness. LLQ Colostomy Bag 

with well formed brown/green stool


Ext: Pulses are strong and equal, Capillary Refill is 2 seconds, NO edema


Neuro: CN II through XII are grossly intact





Assessment and Plan:





1). UTI/Pyelonephritis


CT Abdomen/Pelvis shows Right Perinephric Stranding with moderate 

hydroureternephrosis without calculus. Moderate size Right Inguinal Hernia 

containing loops of bowel without obstruction


Meropenem 500 mg IV Q12H (2/8/18)


Urine Culture shows Proteus mirabilis sensitive to Meropenem


Repeat Urine Culture ordered for 2/11/18


Blood Culture is NGTD


ID Dr. SANJANA Benitez


Urology Dr. MASTER Manrique





2). Chronic Kidney Failure (CKD Stage 4)


Nephrology Dr. Seo: follows patient in outpatient setting and will follow up 

baseline renal numbers


Cr has improved with hydration NS at 80 ml/hour


Sodium Bicarbonate 650 mg PO BID for the low bicarbonate





3). Hx HTN


This is a correction: patient is on Altace at home which was held secondry to 

the elevated Cr


Blood pressure is relatively under control and will add Norvasc should it rise





4). Hx DM 2 on Insulin


Levemir 9 Units SC HS: he is on 18 units at home but this was halved at time of 

admission due to hypoglycemia


RISS ACHS


HOLDING Januvia 25 mg PO 1x/day and Glimepiride 4 mg PO 1x/day





5). Hx HLD


Crestor 10 mg PO HS





6). Hx Rectal CA S/P LLQ Colostomy 2001





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





8). Hx BPH


Flomax 0.4 mg PO 1x/day





9). Hx Questionable CVA


2/9/18: Patient could not provide details other than "Dr. Moseley told me that I 

had a small stroke 5 to 6 months ago and started me on Plavix"


Plavix 75 mg PO 1x/day





10). Hx Iron Deficiency Anemia


Ferrous Sulfate 325 mg PO 2x/day


As the HgB/Hct is low and has dropped (may be secondary to the IVF), Heparin 

for DVT prophylaxis has been discontinued





11). Prophylaxis


Tylenol 650 mg PO Q6H PRN Fever


Heparin was discontinued due to Anemia and patient is on Bilateral SCDs


MVI


Protonix 40 mg PO 1x/day


NS at 80 ml/hr





Cornel Jarvis D.O.

## 2018-02-11 VITALS — RESPIRATION RATE: 20 BRPM

## 2018-02-11 LAB
ALBUMIN SERPL-MCNC: 3.2 G/DL (ref 3.5–5)
ALBUMIN/GLOB SERPL: 1 {RATIO} (ref 1–2.1)
ALT SERPL-CCNC: 78 U/L (ref 21–72)
AST SERPL-CCNC: 73 U/L (ref 17–59)
BASOPHILS # BLD AUTO: 0 K/UL (ref 0–0.2)
BASOPHILS NFR BLD: 0.5 % (ref 0–2)
BUN SERPL-MCNC: 28 MG/DL (ref 9–20)
CALCIUM SERPL-MCNC: 8.9 MG/DL (ref 8.6–10.4)
EOSINOPHIL # BLD AUTO: 0.2 K/UL (ref 0–0.7)
EOSINOPHIL NFR BLD: 3 % (ref 0–4)
ERYTHROCYTE [DISTWIDTH] IN BLOOD BY AUTOMATED COUNT: 17.6 % (ref 11.5–14.5)
GFR NON-AFRICAN AMERICAN: 36
HGB BLD-MCNC: 7.1 G/DL (ref 12–18)
LYMPHOCYTES # BLD AUTO: 1.2 K/UL (ref 1–4.3)
LYMPHOCYTES NFR BLD AUTO: 17.4 % (ref 20–40)
MAGNESIUM SERPL-MCNC: 1.9 MG/DL (ref 1.6–2.3)
MCH RBC QN AUTO: 21 PG (ref 27–31)
MCHC RBC AUTO-ENTMCNC: 32.7 G/DL (ref 33–37)
MCV RBC AUTO: 64.2 FL (ref 80–94)
MONOCYTES # BLD: 0.8 K/UL (ref 0–0.8)
MONOCYTES NFR BLD: 11.7 % (ref 0–10)
NEUTROPHILS # BLD: 4.5 K/UL (ref 1.8–7)
NEUTROPHILS NFR BLD AUTO: 67.4 % (ref 50–75)
NRBC BLD AUTO-RTO: 0 % (ref 0–2)
PLATELET # BLD: 195 K/UL (ref 130–400)
PMV BLD AUTO: 7.6 FL (ref 7.2–11.7)
RBC # BLD AUTO: 3.4 MIL/UL (ref 4.4–5.9)
WBC # BLD AUTO: 6.8 K/UL (ref 4.8–10.8)

## 2018-02-11 RX ADMIN — Medication SCH MG: at 17:59

## 2018-02-11 RX ADMIN — Medication SCH TAB: at 09:05

## 2018-02-11 RX ADMIN — HUMAN INSULIN SCH: 100 INJECTION, SOLUTION SUBCUTANEOUS at 07:14

## 2018-02-11 RX ADMIN — INSULIN DETEMIR SCH UNIT: 100 INJECTION, SOLUTION SUBCUTANEOUS at 22:05

## 2018-02-11 RX ADMIN — HUMAN INSULIN SCH UNIT: 100 INJECTION, SOLUTION SUBCUTANEOUS at 12:32

## 2018-02-11 RX ADMIN — PANTOPRAZOLE SODIUM SCH MG: 40 TABLET, DELAYED RELEASE ORAL at 09:04

## 2018-02-11 RX ADMIN — Medication SCH MG: at 09:04

## 2018-02-11 RX ADMIN — HUMAN INSULIN SCH: 100 INJECTION, SOLUTION SUBCUTANEOUS at 22:06

## 2018-02-11 RX ADMIN — HUMAN INSULIN SCH UNIT: 100 INJECTION, SOLUTION SUBCUTANEOUS at 17:58

## 2018-02-11 NOTE — CP.PCM.PN
<Ariel Yao - Last Filed: 02/11/18 10:08>





Subjective





- Date & Time of Evaluation


Date of Evaluation: 02/11/18


Time of Evaluation: 10:00





- Subjective


Subjective: 





Medicine progress note for Dr. KOFFI Jarvis





Patient seen and examined. Patient reports that he feels well today. Denies 

subjective fevers, chills, chest pain, dyspnea, abdominal pain, dysuria.





Objective





- Vital Signs/Intake and Output


Vital Signs (last 24 hours): 


 











Temp Pulse Resp BP Pulse Ox


 


 98.9 F   89   20   146/69   98 


 


 02/11/18 07:35  02/11/18 07:35  02/11/18 07:35  02/11/18 07:35  02/11/18 07:35








Intake and Output: 


 











 02/11/18 02/11/18





 06:59 18:59


 


Intake Total 240 


 


Balance 240 














- Medications


Medications: 


 Current Medications





Acetaminophen (Tylenol 325mg Tab)  650 mg PO Q6 PRN


   PRN Reason: Fever >100.4 F


   Last Admin: 02/08/18 16:08 Dose:  650 mg


Clopidogrel Bisulfate (Plavix)  75 mg PO DAILY [facility]


   Last Admin: 02/11/18 09:05 Dose:  75 mg


Dextrose (Dextrose 50% Inj)  0 ml IV STAT PRN; Protocol


   PRN Reason: Hypoglycemia Protocol


Dextrose (Glutose 15)  0 gm PO ONCE PRN; Protocol


   PRN Reason: Hypoglycemia Protocol


Ferrous Sulfate (Feosol)  325 mg PO BID [facility]


   Last Admin: 02/11/18 09:04 Dose:  325 mg


Glucagon (Glucagen Diagnostic Kit)  0 mg IM STAT PRN; Protocol


   PRN Reason: Hypoglycemia Protocol


Meropenem 500 mg/ Sodium (Chloride)  100 mls @ 100 mls/hr IVPB Q12H [facility]


   Last Admin: 02/11/18 05:39 Dose:  100 mls/hr


Dextrose (Dextrose 5% In Water 1000 Ml)  1,000 mls @ 0 mls/hr IV .Q0M PRN; 

Protocol; Per Protocol


   PRN Reason: Hypoglycemia Protocol


Insulin Detemir (Levemir)  9 unit SC HS [facility]


   Last Admin: 02/10/18 21:40 Dose:  9 unit


Insulin Human Regular (Novolin R)  0 unit SC ACHS [facility]


   PRN Reason: Protocol


   Last Admin: 02/11/18 07:14 Dose:  Not Given


Multivitamins (Hexavitamin)  1 tab PO DAILY [facility]


   Last Admin: 02/11/18 09:05 Dose:  1 tab


Pantoprazole Sodium (Protonix Ec Tab)  40 mg PO DAILY [facility]


   Last Admin: 02/11/18 09:04 Dose:  40 mg


Rosuvastatin Calcium (Crestor)  10 mg PO HS [facility]


   Last Admin: 02/10/18 21:41 Dose:  10 mg


Saccharomyces Boulardii (Florastor)  250 mg PO BID [facility]


   Last Admin: 02/11/18 09:04 Dose:  250 mg


Sodium Bicarbonate (Sodium Bicarbonate Tab)  650 mg PO BID [facility]


   Last Admin: 02/11/18 09:05 Dose:  650 mg


Tamsulosin HCl (Flomax)  0.4 mg PO DAILY [facility]


   Last Admin: 02/11/18 09:04 Dose:  0.4 mg











- Labs


Labs: 


 





 02/11/18 07:05 





 02/11/18 07:05 











- Additional Findings


Additional findings: 





- Constitutional


Appears: No Acute Distress





- Head Exam


Head Exam: ATRAUMATIC, NORMOCEPHALIC





- Eye Exam


Eye Exam: EOMI, Normal appearance





- ENT Exam


ENT Exam: Mucous Membranes Moist





- Respiratory Exam


Respiratory Exam: Clear to Auscultation Bilateral, NORMAL BREATHING PATTERN.  

absent: Rales, Rhonchi, Wheezes





- Cardiovascular Exam


Cardiovascular Exam: REGULAR RHYTHM, +S1, +S2





- GI/Abdominal Exam


GI & Abdominal Exam: Normal Bowel Sounds, Soft.  absent: Tenderness


Additional comments: 





Left sided colostomy with no evidence of infection in the surrounding regions





- Extremities Exam


Extremities exam: Positive for: pedal pulses present.  Negative for: pedal edema

, tenderness





- Neurological Exam


Neurological exam: Alert, CN II-XII Intact, Oriented x3





- Psychiatric Exam


Psychiatric exam: Normal Affect, Normal Mood





- Skin


Skin Exam: Dry, Warm


Additional comments: 





Surgical scar in the center of the lower abdomen. Colostomy on the left lower 

abdomen with no evidence of infection in the surrounding area.





Assessment and Plan





- Assessment and Plan (Free Text)


Plan: 





UTI/Pyelonephritis


+3 leukocyte esterase


Urine cultures grew Proteus Mirabilis


ID consult Dr. Benitez, help appreciated


CT abdomen/pelvis without contrast shows Right Perinephric Stranding with 

moderate hydroureternephrosis without calculus. Moderate size Right Inguinal 

Hernia containing loops of bowel without obstruction


Meropenem 500 mg IV Q12H


Abdominal ultrasound shows hydronephrosis


Urology consult Dr. VERO Manrique, help appreciated





Acute on Chronic Kidney Disease


Baseline creatinine 1.5


NS 80 cc/hr discontinued


Nephrology consult Dr. Seo, help appreciated


24 hour urine protein 216 (reference range )





History of Hypertension


Held home Ramipril due to acute kidney injury


Monitor BP





History of Diabetes


Previous hemoglobin A1c 7 in November 2017


f/u repeat A1c


Halved home Levemir 18 units to 9 units HS


Held home Januvia 25 mg PO daily


Regular ISS-low





History of Hyperlipidemia


Home Lipitor not on formulary. 


Crestor 10 mg PO HS





History of BPH


Flomax 0.4 mg PO 1x/day





History of Questionable CVA


Patient could not provide details other than "Dr. Moseley told me that I had a 

small stroke 5 to 6 months ago and started me on Plavix"


Plavix 75 mg PO daily





Prophylactic Measure


Tylenol 650 mg PO Q6 prn fever


Heparin 5000 units SC Q8 was discontinued due to H/H


Protonix 40 mg PO daily


Diabetic diet


MVI





<Cornel Jarvis - Last Filed: 02/11/18 10:43>





Objective





- Vital Signs/Intake and Output


Vital Signs (last 24 hours): 


 











Temp Pulse Resp BP Pulse Ox


 


 98.9 F   89   20   146/69   98 


 


 02/11/18 07:35  02/11/18 07:35  02/11/18 07:35  02/11/18 07:35  02/11/18 07:35








Intake and Output: 


 











 02/11/18 02/11/18





 06:59 18:59


 


Intake Total 240 


 


Balance 240 














- Medications


Medications: 


 Current Medications





Acetaminophen (Tylenol 325mg Tab)  650 mg PO Q6 PRN


   PRN Reason: Fever >100.4 F


   Last Admin: 02/08/18 16:08 Dose:  650 mg


Clopidogrel Bisulfate (Plavix)  75 mg PO DAILY [facility]


   Last Admin: 02/11/18 09:05 Dose:  75 mg


Dextrose (Dextrose 50% Inj)  0 ml IV STAT PRN; Protocol


   PRN Reason: Hypoglycemia Protocol


Dextrose (Glutose 15)  0 gm PO ONCE PRN; Protocol


   PRN Reason: Hypoglycemia Protocol


Ferrous Sulfate (Feosol)  325 mg PO BID [facility]


   Last Admin: 02/11/18 09:04 Dose:  325 mg


Glucagon (Glucagen Diagnostic Kit)  0 mg IM STAT PRN; Protocol


   PRN Reason: Hypoglycemia Protocol


Meropenem 500 mg/ Sodium (Chloride)  100 mls @ 100 mls/hr IVPB Q12H [facility]


   Last Admin: 02/11/18 05:39 Dose:  100 mls/hr


Dextrose (Dextrose 5% In Water 1000 Ml)  1,000 mls @ 0 mls/hr IV .Q0M PRN; 

Protocol; Per Protocol


   PRN Reason: Hypoglycemia Protocol


Insulin Detemir (Levemir)  9 unit SC HS [facility]


   Last Admin: 02/10/18 21:40 Dose:  9 unit


Insulin Human Regular (Novolin R)  0 unit SC ACHS [facility]


   PRN Reason: Protocol


   Last Admin: 02/11/18 07:14 Dose:  Not Given


Multivitamins (Hexavitamin)  1 tab PO DAILY [facility]


   Last Admin: 02/11/18 09:05 Dose:  1 tab


Pantoprazole Sodium (Protonix Ec Tab)  40 mg PO DAILY [facility]


   Last Admin: 02/11/18 09:04 Dose:  40 mg


Rosuvastatin Calcium (Crestor)  10 mg PO HS [facility]


   Last Admin: 02/10/18 21:41 Dose:  10 mg


Saccharomyces Boulardii (Florastor)  250 mg PO BID [facility]


   Last Admin: 02/11/18 09:04 Dose:  250 mg


Sodium Bicarbonate (Sodium Bicarbonate Tab)  650 mg PO BID [facility]


   Last Admin: 02/11/18 09:05 Dose:  650 mg


Tamsulosin HCl (Flomax)  0.4 mg PO DAILY [facility]


   Last Admin: 02/11/18 09:04 Dose:  0.4 mg











- Labs


Labs: 


 





 02/11/18 07:05 





 02/11/18 07:05 











Attending/Attestation





- Attestation


I have personally seen and examined this patient.: Yes


I have fully participated in the care of the patient.: Yes


I have reviewed all pertinent clinical information, including history, physical 

exam and plan: Yes


Notes (Text): 





02/11/18 10:37


Hospitalist Progress Note 





Patient was seen and examined at 10:30 AM 2/11/18





85 year old male (CKD Stage 4, HTN, DM 1, HLD, Rectal CA S/P Colostomy 2001, 

Congenital Absence of Left Kidney, Iron Deficiency Anemia, CVA) who was 

admitted for evaluation of burning and pain upon urination. He was found to 

have a UTI/Right Pyelopnephritis. 





Currently upon FULL ROS:


No longer having burning/pain with urination


NO chest pain


NO palpitations


NO SOB/Cough/Wheezing


NO dysphagia/odynophagia


NO abdominal pain


NO n/v/d/c: he emptied his LLQ Colostomy bag this morning of well formed stool 

that is not bloody/not black


NO headaches


NO new changes in vision


NO new change in hearing


NO paresthesias


NO lightheadedness/dizziness





Exam:


General: AAOX3, NAD


HEENT: NCA, EOMI, PERRLA, NO cervical/supraclavicular/submandibular 

lymphadenopathy, NO pharyngeal erythema/exudate, Nasal Turbinates are 

nonerythematous/nonedematous, Oral Mucosa is moist


Cardio: NS1 and NS2, NO M/R/G


Resp: CTA B/L, NO R/R/W


GI: BSx4, Soft, NT, NO HSM, NO guarding/rebound tenderness. LLQ Colostomy Bag 

with well formed brown/green stool


Ext: Pulses are strong and equal, Capillary Refill is 2 seconds, NO edema


Neuro: CN II through XII are grossly intact





Assessment and Plan:





1). UTI/Pyelonephritis


CT Abdomen/Pelvis shows Right Perinephric Stranding with moderate 

hydroureternephrosis without calculus. Moderate size Right Inguinal Hernia 

containing loops of bowel without obstruction


Meropenem 500 mg IV Q12H (2/8/18)


Urine Culture shows Proteus mirabilis sensitive to Meropenem


Repeat Urine Culture ordered for 2/11/18


Blood Culture is negative to date


ID Dr. SANJANA Benitez


Urology Dr. MASTER Manrique





2). Chronic Kidney Failure (CKD Stage 4)


Nephrology Dr. Seo: follows patient in outpatient setting and will follow up 

baseline renal numbers


Cr has improved with IV hydration and IVF were discontinued by Nephrology 2/11/ 18. Patient has been encouraged to drink at least 1 cup of 8 oz water every 2 

hours while awake


Sodium Bicarbonate 650 mg PO BID for the low bicarbonate





3). Hx HTN


Patient is on Altace at home which was held secondry to the elevated Cr


Blood pressure is relatively under control and will add Norvasc should it rise





4). Hx DM 2 on Insulin


Levemir 9 Units SC HS: he is on 18 units at home but this was halved at time of 

admission due to hypoglycemia


[facility]ISS ACHS


HOLDING Januvia 25 mg PO 1x/day and Glimepiride 4 mg PO 1x/day and these should 

be added back on if the blood glucose is not under control on 2/11/18





5). Hx HLD


Crestor 10 mg PO HS





6). Hx Rectal CA S/P LLQ Colostomy 2001





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





8). Hx BPH


Flomax 0.4 mg PO 1x/day





9). Hx Questionable CVA


2/9/18: Patient could not provide details other than "Dr. Moseley told me that I 

had a small stroke 5 to 6 months ago and started me on Plavix"


Plavix 75 mg PO 1x/day





10). Hx Iron Deficiency Anemia


Ferrous Sulfate 325 mg PO 2x/day


HgB/Hct is at 7.1. Patient states that it is always low. There are no 

complaints of lightheadedness/dizziness, fatigue, palpitations. Vitals are 

stable. Transfuse should he become symptomatic or if HgB < 7.0.





11). Prophylaxis


Tylenol 650 mg PO Q6H PRN Fever


Heparin was discontinued due to Anemia and patient is on Bilateral SCDs


MVI


Protonix 40 mg PO 1x/day

## 2018-02-12 LAB
ALBUMIN SERPL-MCNC: 3.4 G/DL (ref 3.5–5)
ALBUMIN/GLOB SERPL: 1 {RATIO} (ref 1–2.1)
ALT SERPL-CCNC: 94 U/L (ref 21–72)
AST SERPL-CCNC: 74 U/L (ref 17–59)
BASOPHILS # BLD AUTO: 0 K/UL (ref 0–0.2)
BASOPHILS NFR BLD: 0.4 % (ref 0–2)
BUN SERPL-MCNC: 25 MG/DL (ref 9–20)
CALCIUM SERPL-MCNC: 9 MG/DL (ref 8.6–10.4)
EOSINOPHIL # BLD AUTO: 0.3 K/UL (ref 0–0.7)
EOSINOPHIL NFR BLD: 3.6 % (ref 0–4)
ERYTHROCYTE [DISTWIDTH] IN BLOOD BY AUTOMATED COUNT: 17.3 % (ref 11.5–14.5)
GFR NON-AFRICAN AMERICAN: 38
HGB BLD-MCNC: 7.2 G/DL (ref 12–18)
LYMPHOCYTES # BLD AUTO: 1.7 K/UL (ref 1–4.3)
LYMPHOCYTES NFR BLD AUTO: 21.9 % (ref 20–40)
MAGNESIUM SERPL-MCNC: 1.7 MG/DL (ref 1.6–2.3)
MCH RBC QN AUTO: 20.7 PG (ref 27–31)
MCHC RBC AUTO-ENTMCNC: 32.5 G/DL (ref 33–37)
MCV RBC AUTO: 63.8 FL (ref 80–94)
MONOCYTES # BLD: 0.9 K/UL (ref 0–0.8)
MONOCYTES NFR BLD: 11.8 % (ref 0–10)
NEUTROPHILS # BLD: 4.7 K/UL (ref 1.8–7)
NEUTROPHILS NFR BLD AUTO: 62.3 % (ref 50–75)
NRBC BLD AUTO-RTO: 0 % (ref 0–2)
PLATELET # BLD: 219 K/UL (ref 130–400)
PMV BLD AUTO: 7.1 FL (ref 7.2–11.7)
RBC # BLD AUTO: 3.47 MIL/UL (ref 4.4–5.9)
WBC # BLD AUTO: 7.6 K/UL (ref 4.8–10.8)

## 2018-02-12 RX ADMIN — HUMAN INSULIN SCH UNIT: 100 INJECTION, SOLUTION SUBCUTANEOUS at 08:30

## 2018-02-12 RX ADMIN — Medication SCH MG: at 09:11

## 2018-02-12 RX ADMIN — HUMAN INSULIN SCH UNIT: 100 INJECTION, SOLUTION SUBCUTANEOUS at 12:30

## 2018-02-12 RX ADMIN — HUMAN INSULIN SCH UNIT: 100 INJECTION, SOLUTION SUBCUTANEOUS at 17:30

## 2018-02-12 RX ADMIN — Medication SCH MG: at 17:06

## 2018-02-12 RX ADMIN — INSULIN DETEMIR SCH UNIT: 100 INJECTION, SOLUTION SUBCUTANEOUS at 22:21

## 2018-02-12 RX ADMIN — HUMAN INSULIN SCH: 100 INJECTION, SOLUTION SUBCUTANEOUS at 21:51

## 2018-02-12 RX ADMIN — Medication SCH TAB: at 09:12

## 2018-02-12 RX ADMIN — PANTOPRAZOLE SODIUM SCH MG: 40 TABLET, DELAYED RELEASE ORAL at 09:12

## 2018-02-12 NOTE — PN
DATE:



INFECTIOUS DISEASE FOLLOW UP



SUBJECTIVE:  The patient says he is feeling better.  Has no more dysuria,

no chills, no fever.  He is on IV fluids.  His hemoglobin dropped to 7.2,

he says he has received Epogen in the past, so we will discuss with the

renal if he needs it 3 times a week and he also has a urologist and noted

renal note.  Patient needs to follow up with the urologist as outpatient. 

His urine had proteus and it is improving.  We will continue with Maxipime

and once he is ready to go home he can go home on Vantin 200 mg a day or

100 b.i.d. for 7 days and he needs to make an appointment with the

urologist with a CAT scan report as outpatient.  He still is very anemic

could _____.



PHYSICAL EXAMINATION:

VITAL SIGNS:  T-max is 97.9, pulse 77, blood pressure 133/69, respirations

are 20.

HEENT:  Head is atraumatic and normocephalic.  Pupils are reacting to

light.

NECK:  Supple.  JVP is flat.

LUNGS:  Clear.  No crackles are present.

HEART:  S1 and S2 are regular.

ABDOMEN:  Soft and nontender.  He has a colostomy, which is functioning.

EXTREMITIES:  Have no edema.



ASSESSMENT AND PLAN:  Urine has Proteus mirabilis and repeat cultures is

negative now.  He has chronic right hydronephrosis and he only has one

kidney and he is diabetic to continue present treatment and to follow with

the primary.  He is started on Feosol and will see when renal decides to

send him home.







__________________________________________

Anil Benitez MD





DD:  02/12/2018 19:17:25

DT:  02/12/2018 20:33:56

Job # [id number]

## 2018-02-12 NOTE — CP.PCM.PN
Subjective





- Date & Time of Evaluation


Date of Evaluation: 02/12/18


Time of Evaluation: 03:00





- Subjective


Subjective: 





dictated





Objective





- Vital Signs/Intake and Output


Vital Signs (last 24 hours): 


 











Temp Pulse Resp BP Pulse Ox


 


 98.8 F   77   20   133/73   97 


 


 02/12/18 07:48  02/12/18 07:48  02/12/18 07:48  02/12/18 07:48  02/12/18 07:48








Intake and Output: 


 











 02/12/18 02/12/18





 06:59 18:59


 


Intake Total 740 300


 


Balance 740 300














- Medications


Medications: 


 Current Medications





Acetaminophen (Tylenol 325mg Tab)  650 mg PO Q6 PRN


   PRN Reason: Fever >100.4 F


   Last Admin: 02/08/18 16:08 Dose:  650 mg


Clopidogrel Bisulfate (Plavix)  75 mg PO DAILY [facility]


   Last Admin: 02/12/18 09:12 Dose:  75 mg


Dextrose (Dextrose 50% Inj)  0 ml IV STAT PRN; Protocol


   PRN Reason: Hypoglycemia Protocol


Dextrose (Glutose 15)  0 gm PO ONCE PRN; Protocol


   PRN Reason: Hypoglycemia Protocol


Ferrous Sulfate (Feosol)  325 mg PO BID [facility]


   Last Admin: 02/12/18 09:12 Dose:  325 mg


Glucagon (Glucagen Diagnostic Kit)  0 mg IM STAT PRN; Protocol


   PRN Reason: Hypoglycemia Protocol


Dextrose (Dextrose 5% In Water 1000 Ml)  1,000 mls @ 0 mls/hr IV .Q0M PRN; 

Protocol; Per Protocol


   PRN Reason: Hypoglycemia Protocol


Cefepime HCl 1 gm/ Dextrose  50 mls @ 100 mls/hr IVPB Q12H [facility]


   Last Admin: 02/12/18 07:44 Dose:  100 mls/hr


Insulin Detemir (Levemir)  9 unit SC HS [facility]


   Last Admin: 02/11/18 22:05 Dose:  9 unit


Insulin Human Regular (Novolin R)  0 unit SC ACHS [facility]


   PRN Reason: Protocol


   Last Admin: 02/12/18 12:30 Dose:  3 unit


Multivitamins (Hexavitamin)  1 tab PO DAILY [facility]


   Last Admin: 02/12/18 09:12 Dose:  1 tab


Pantoprazole Sodium (Protonix Ec Tab)  40 mg PO DAILY [facility]


   Last Admin: 02/12/18 09:12 Dose:  40 mg


Rosuvastatin Calcium (Crestor)  10 mg PO HS [facility]


   Last Admin: 02/11/18 22:05 Dose:  10 mg


Saccharomyces Boulardii (Florastor)  250 mg PO BID [facility]


   Last Admin: 02/12/18 09:11 Dose:  250 mg


Sodium Bicarbonate (Sodium Bicarbonate Tab)  650 mg PO BID [facility]


   Last Admin: 02/12/18 09:12 Dose:  650 mg


Tamsulosin HCl (Flomax)  0.4 mg PO DAILY [facility]


   Last Admin: 02/12/18 09:12 Dose:  0.4 mg











- Labs


Labs: 


 





 02/12/18 07:35 





 02/12/18 07:35

## 2018-02-12 NOTE — CP.PCM.PN
<Julia Hawthorne E - Last Filed: 02/12/18 18:26>





Subjective





- Date & Time of Evaluation


Date of Evaluation: 02/12/18


Time of Evaluation: 09:30





- Subjective


Subjective: 


Medicine Note ( Dr. Tim's service)


Patient was seen and examined at bedside. Patient reports that he is doing 

well. Patient is eating and ambulating. Patient denies chest pain, sob, 

palpitations, fever, nausea, chills, abdominal pain, dysuria, urinary frequency 

or flank pain. 





Objective





- Vital Signs/Intake and Output


Vital Signs (last 24 hours): 


 











Temp Pulse Resp BP Pulse Ox


 


 98.8 F   77   20   133/73   97 


 


 02/12/18 07:48  02/12/18 07:48  02/12/18 07:48  02/12/18 07:48  02/12/18 07:48








Intake and Output: 


 











 02/12/18 02/12/18





 06:59 18:59


 


Intake Total 740 


 


Balance 740 














- Medications


Medications: 


 Current Medications





Acetaminophen (Tylenol 325mg Tab)  650 mg PO Q6 PRN


   PRN Reason: Fever >100.4 F


   Last Admin: 02/08/18 16:08 Dose:  650 mg


Clopidogrel Bisulfate (Plavix)  75 mg PO DAILY [facility]


   Last Admin: 02/12/18 09:12 Dose:  75 mg


Dextrose (Dextrose 50% Inj)  0 ml IV STAT PRN; Protocol


   PRN Reason: Hypoglycemia Protocol


Dextrose (Glutose 15)  0 gm PO ONCE PRN; Protocol


   PRN Reason: Hypoglycemia Protocol


Ferrous Sulfate (Feosol)  325 mg PO BID [facility]


   Last Admin: 02/12/18 09:12 Dose:  325 mg


Glucagon (Glucagen Diagnostic Kit)  0 mg IM STAT PRN; Protocol


   PRN Reason: Hypoglycemia Protocol


Dextrose (Dextrose 5% In Water 1000 Ml)  1,000 mls @ 0 mls/hr IV .Q0M PRN; 

Protocol; Per Protocol


   PRN Reason: Hypoglycemia Protocol


Cefepime HCl 1 gm/ Dextrose  50 mls @ 100 mls/hr IVPB Q12H [facility]


   Last Admin: 02/12/18 07:44 Dose:  100 mls/hr


Insulin Detemir (Levemir)  9 unit SC HS [facility]


   Last Admin: 02/11/18 22:05 Dose:  9 unit


Insulin Human Regular (Novolin R)  0 unit SC ACHS [facility]


   PRN Reason: Protocol


   Last Admin: 02/12/18 08:30 Dose:  1 unit


Multivitamins (Hexavitamin)  1 tab PO DAILY [facility]


   Last Admin: 02/12/18 09:12 Dose:  1 tab


Pantoprazole Sodium (Protonix Ec Tab)  40 mg PO DAILY [facility]


   Last Admin: 02/12/18 09:12 Dose:  40 mg


Rosuvastatin Calcium (Crestor)  10 mg PO HS [facility]


   Last Admin: 02/11/18 22:05 Dose:  10 mg


Saccharomyces Boulardii (Florastor)  250 mg PO BID [facility]


   Last Admin: 02/12/18 09:11 Dose:  250 mg


Sodium Bicarbonate (Sodium Bicarbonate Tab)  650 mg PO BID [facility]


   Last Admin: 02/12/18 09:12 Dose:  650 mg


Tamsulosin HCl (Flomax)  0.4 mg PO DAILY [facility]


   Last Admin: 02/12/18 09:12 Dose:  0.4 mg











- Labs


Labs: 


 





 02/12/18 07:35 





 02/12/18 07:35 











- Constitutional


Appears: Well, No Acute Distress





- Head Exam


Head Exam: ATRAUMATIC, NORMAL INSPECTION





- Eye Exam


Eye Exam: EOMI, Normal appearance





- ENT Exam


ENT Exam: Mucous Membranes Moist





- Respiratory Exam


Respiratory Exam: Clear to Ausculation Bilateral, NORMAL BREATHING PATTERN.  

absent: Prolonged Expiratory Phase, Rales, Rhonchi, Wheezes





- Cardiovascular Exam


Cardiovascular Exam: REGULAR RHYTHM, +S1, +S2





- GI/Abdominal Exam


GI & Abdominal Exam: Soft, Normal Bowel Sounds.  absent: Distended, Firm, 

Guarding, Rigid, Tenderness





- Extremities Exam


Extremities Exam: Normal Inspection.  absent: Calf Tenderness, Pedal Edema





- Back Exam


Back Exam: NORMAL INSPECTION.  absent: CVA tenderness (L), CVA tenderness (R)





- Neurological Exam


Neurological Exam: Alert, Awake, Oriented x3





- Psychiatric Exam


Psychiatric exam: Normal Affect, Normal Mood





- Skin


Skin Exam: Normal Color





Assessment and Plan


(1) Acute pyelonephritis


Assessment & Plan: 


Urology Consult, Dr. Manrique---> Help appreciated 


* Management as per recommendation 


Infectious disease consult, Dr. Benitez----> Help appreciated


Secondary to UTI 


Labs:


On admission:


UA: LE (3+), WBC (2005), RBC (7), Increased occult bacteria


UC: Proteus Mirabilis 


Repeat UC (2/11/18): No growth 


Blood Culture: No growth 





Imaging:


CT abdomen/pelvis without contrast (2/8/18): shows Right Perinephric Stranding 

with moderate hydroureternephrosis without calculus. Moderate size Right 

Inguinal Hernia containing loops of bowel without obstruction


Abdominal ultrasound (2/8/18): shows hydronephrosis





Medications:


Meropenem 500 mg IV Q12H (2/8/18)--- Switched to Cefepime 1gm Q12H (2/11/18)


Tylenol 650 PO Q6H prn for fever>100.3   


Status: Acute   





(2) TEN (acute kidney injury)


Assessment & Plan: 


Resolving 


Possibly secondary to chronic right hydronephrosis 


Nephrology, ---> help appreciated 


* Management as per recommendation 


Status: Acute   





(3) Diabetes mellitus


Assessment & Plan: 


HgbA1C (11/17/2017): 7.0


Accuchecks


Levemir 9 units HS 


ISS-low dose protocol 


Hypoglycemia protocol 


Status: Acute   





(4) History of hypertension


Assessment & Plan: 


Currently stable


Continue monitor with vital signs


Will reevaluate for need of medication


Status: Acute   





(5) Hyperlipidemia


Assessment & Plan: 


Crestor 10mg PO HS 


Status: Acute   





(6) Benign prostate hyperplasia


Assessment & Plan: 


Flomax 0.4 mg PO daily 


Status: Acute   





(7) Anemia


Assessment & Plan: 


Feosol 325mg PO BID 


f/U Anemia work up 


Status: Acute   





(8) History of CVA (cerebrovascular accident)


Assessment & Plan: 


Crestor 10mg PO HS 


Plavix 75mg Po daily 


Status: Acute   





(9) History of rectal cancer


Assessment & Plan: 


S/P LLQ Colostomy 2001


Status: Acute   





(10) Prophylactic measure


Assessment & Plan: 


GI: Protonix 40mg PO daily 


DVT: Anticoagulation held due to low H/H


Florastor 250mg PO BID





All management and plan discussed with Dr. Tim 


Status: Acute   





<Melissa Tim - Last Filed: 02/13/18 00:08>





Objective





- Vital Signs/Intake and Output


Vital Signs (last 24 hours): 


 











Temp Pulse Resp BP Pulse Ox


 


 97.9 F   77   20   133/69   98 


 


 02/12/18 16:17  02/12/18 16:17  02/12/18 16:17  02/12/18 16:17  02/12/18 16:17








Intake and Output: 


 











 02/12/18 02/13/18





 18:59 06:59


 


Intake Total 300 


 


Balance 300 














- Medications


Medications: 


 Current Medications





Acetaminophen (Tylenol 325mg Tab)  650 mg PO Q6 PRN


   PRN Reason: Fever >100.4 F


   Last Admin: 02/08/18 16:08 Dose:  650 mg


Clopidogrel Bisulfate (Plavix)  75 mg PO DAILY [facility]


   Last Admin: 02/12/18 09:12 Dose:  75 mg


Dextrose (Dextrose 50% Inj)  0 ml IV STAT PRN; Protocol


   PRN Reason: Hypoglycemia Protocol


Dextrose (Glutose 15)  0 gm PO ONCE PRN; Protocol


   PRN Reason: Hypoglycemia Protocol


Ferrous Sulfate (Feosol)  325 mg PO BID [facility]


   Last Admin: 02/12/18 17:06 Dose:  325 mg


Glucagon (Glucagen Diagnostic Kit)  0 mg IM STAT PRN; Protocol


   PRN Reason: Hypoglycemia Protocol


Dextrose (Dextrose 5% In Water 1000 Ml)  1,000 mls @ 0 mls/hr IV .Q0M PRN; 

Protocol; Per Protocol


   PRN Reason: Hypoglycemia Protocol


Cefepime HCl 1 gm/ Dextrose  50 mls @ 100 mls/hr IVPB Q12H [facility]


   Last Admin: 02/12/18 22:20 Dose:  100 mls/hr


Insulin Detemir (Levemir)  9 unit SC [facility]


   Last Admin: 02/12/18 22:21 Dose:  9 unit


Insulin Human Regular (Novolin R)  [facility]


   PRN Reason: Protocol


   Last Admin: 02/12/18 21:51 Dose:  Not Given


Multivitamins (Hexavitamin)  1 tab PO DAILY [facility]


   Last Admin: 02/12/18 09:12 Dose:  1 tab


Pantoprazole Sodium (Protonix Ec Tab)  40 mg PO DAILY [facility]


   Last Admin: 02/12/18 09:12 Dose:  40 mg


Rosuvastatin Calcium (Crestor)  10 mg PO HS [facility]


   Last Admin: 02/12/18 22:22 Dose:  10 mg


Saccharomyces Boulardii (Florastor)  250 mg PO BID [facility]


   Last Admin: 02/12/18 17:06 Dose:  250 mg


Sodium Bicarbonate (Sodium Bicarbonate Tab)  650 mg PO BID [facility]


   Last Admin: 02/12/18 17:06 Dose:  650 mg


Tamsulosin HCl (Flomax)  0.4 mg PO DAILY [facility]


   Last Admin: 02/12/18 09:12 Dose:  0.4 mg











- Labs


Labs: 


 





 02/12/18 07:35 





 02/12/18 07:35 











Attending/Attestation





- Attestation


I have personally seen and examined this patient.: Yes


I have fully participated in the care of the patient.: Yes


I have reviewed all pertinent clinical information, including history, physical 

exam and plan: Yes


Notes (Text): 


Patient seen, examined, and case discussed with day-time resident.


Patient seen this morning, watching television. patient denies acute complaints.


Discussed with patient's sonScooby on the phone with the patient's 

permission. Renal function improving and white count improving.


Patient ordered for irons studies, reticulocyte count, ferritin, vitamin b12, 

folate given anemia.


Discussed with infectious disease/PMD, will likely switch patient's antibiotic 

to PO tomorrow.


Resident spoke with Bisi Manrique, urology, no plans for acute intervention 

because of current infection but when infection has resolved, then will make 

plan for urologic intervention. 





Assessment/Plan


1). UTI/Pyelonephritis


* CT Abdomen/Pelvis shows Right Perinephric Stranding with moderate 

hydroureternephrosis without calculus. Moderate size Right Inguinal Hernia 

containing loops of bowel without obstruction


* Meropenem 500 mg IV Q12H (2/8-10/18) switched to Cefepime 1gram IV Q12H (

active 2/11/18)


* Urine Culture shows Proteus mirabilis sensitive to Meropenem


* Repeat Urine Culture ordered for 2/11/18


* Blood Culture is negative to date


* ID Dr. SANJANA Benitez on board-->help appreciated


* Urology Dr. MASTER Manrique on board-->help appreciated


* Nephrology, Dr. Seo on board-->help appreciated





2). Chronic Kidney Failure (CKD Stage 4)


* Nephrology Dr. Seo: follows patient in outpatient setting and will follow 

up baseline renal numbers


* Cr has improved with IV hydration and IVF were discontinued by Nephrology 2/11 /18. Patient has been encouraged to drink at least 1 cup of 8 oz water every 2 

hours while awake


* Sodium Bicarbonate 650 mg PO BID for the low bicarbonate


* Creatinine improving





3). Hx HTN


* Patient is on Altace at home which was held secondry to the elevated Cr


* Blood pressure is relatively under control and will add Norvasc should it rise





4). Hx DM 2 on Insulin


* Levemir 9 Units SC HS: he is on 18 units at home but this was halved at time 

of admission due to hypoglycemia


* RISS ACHS


* HOLDING Januvia 25 mg PO 1x/day and Glimepiride 4 mg PO 1x/day and these 

should be added back on if the blood glucose is not under control on 2/11/18





5). Hx HLD


* Crestor 10 mg PO HS





6). Hx Rectal CA S/P LLQ Colostomy 2001


* working





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





8). Hx BPH


* Flomax 0.4 mg PO 1x/day





9). Hx Questionable CVA


* 2/9/18: Patient could not provide details other than "Dr. Moseley told me that 

I had a small stroke 5 to 6 months ago and started me on Plavix"


* Plavix 75 mg PO 1x/day


* Crestor 10mg POqHS





10). Hx Iron Deficiency Anemia


* Ferrous Sulfate 325 mg PO 2x/day


* HgB/Hct is at 7.1. Patient states that it is always low. There are no 

complaints of lightheadedness/dizziness, fatigue, palpitations. Vitals are 

stable. Transfuse should he become symptomatic or if HgB < 7.0


* Iron studies, reticulocyte count, occult blood, ferritin


* PMD is requesting from nephrology for prescription for EPO on discharge





11). Prophylaxis


* Tylenol 650 mg PO Q6H PRN Fever


* Heparin was discontinued due to Anemia and patient is on Bilateral SCDs


* MVI


* Protonix 40 mg PO 1x/day





Disposition: Discharge planning for tomorrow, ID to switch to PO antibiotics 

for urinary tract infection, PMD requesting script for Epocrit from nephrology, 

patient will need to follow-up with urology following completion of antibiotics

## 2018-02-12 NOTE — CP.PCM.PN
Subjective





- Date & Time of Evaluation


Date of Evaluation: 02/12/18


Time of Evaluation: 11:43





- Subjective


Subjective: 





seen and examined


notes reviewed





review of prior imaging: chronic rt hydronephrosis (also present on US in 2016)

. pt follows w/ urologist outpt


denies any n/v/d/f/c/cp/sob/dizziness/rash








Objective





- Vital Signs/Intake and Output


Vital Signs (last 24 hours): 


 











Temp Pulse Resp BP Pulse Ox


 


 98.8 F   77   20   133/73   97 


 


 02/12/18 07:48  02/12/18 07:48  02/12/18 07:48  02/12/18 07:48  02/12/18 07:48








Intake and Output: 


 











 02/12/18 02/12/18





 06:59 18:59


 


Intake Total 740 


 


Balance 740 














- Medications


Medications: 


 Current Medications





Acetaminophen (Tylenol 325mg Tab)  650 mg PO Q6 PRN


   PRN Reason: Fever >100.4 F


   Last Admin: 02/08/18 16:08 Dose:  650 mg


Clopidogrel Bisulfate (Plavix)  75 mg PO DAILY [facility]


   Last Admin: 02/12/18 09:12 Dose:  75 mg


Dextrose (Dextrose 50% Inj)  0 ml IV STAT PRN; Protocol


   PRN Reason: Hypoglycemia Protocol


Dextrose (Glutose 15)  0 gm PO ONCE PRN; Protocol


   PRN Reason: Hypoglycemia Protocol


Ferrous Sulfate (Feosol)  325 mg PO BID [facility]


   Last Admin: 02/12/18 09:12 Dose:  325 mg


Glucagon (Glucagen Diagnostic Kit)  0 mg IM STAT PRN; Protocol


   PRN Reason: Hypoglycemia Protocol


Dextrose (Dextrose 5% In Water 1000 Ml)  1,000 mls @ 0 mls/hr IV .Q0M PRN; 

Protocol; Per Protocol


   PRN Reason: Hypoglycemia Protocol


Cefepime HCl 1 gm/ Dextrose  50 mls @ 100 mls/hr IVPB Q12H [facility]


   Last Admin: 02/12/18 07:44 Dose:  100 mls/hr


Insulin Detemir (Levemir)  9 unit SC HS [facility]


   Last Admin: 02/11/18 22:05 Dose:  9 unit


Insulin Human Regular (Novolin R)  0 unit SC ACHS [facility]


   PRN Reason: Protocol


   Last Admin: 02/12/18 08:30 Dose:  1 unit


Multivitamins (Hexavitamin)  1 tab PO DAILY [facility]


   Last Admin: 02/12/18 09:12 Dose:  1 tab


Pantoprazole Sodium (Protonix Ec Tab)  40 mg PO DAILY [facility]


   Last Admin: 02/12/18 09:12 Dose:  40 mg


Rosuvastatin Calcium (Crestor)  10 mg PO HS [facility]


   Last Admin: 02/11/18 22:05 Dose:  10 mg


Saccharomyces Boulardii (Florastor)  250 mg PO BID [facility]


   Last Admin: 02/12/18 09:11 Dose:  250 mg


Sodium Bicarbonate (Sodium Bicarbonate Tab)  650 mg PO BID [facility]


   Last Admin: 02/12/18 09:12 Dose:  650 mg


Tamsulosin HCl (Flomax)  0.4 mg PO DAILY [facility]


   Last Admin: 02/12/18 09:12 Dose:  0.4 mg











- Labs


Labs: 


 





 02/12/18 07:35 





 02/12/18 07:35 











- Constitutional


Appears: Non-toxic, No Acute Distress





- Head Exam


Head Exam: NORMAL INSPECTION





- Eye Exam


Eye Exam: Normal appearance, PERRL


Pupil Exam: PERRL





- ENT Exam


ENT Exam: Mucous Membranes Moist, Normal Exam





- Neck Exam


Neck Exam: Full ROM, Normal Inspection





- Respiratory Exam


Respiratory Exam: Clear to Ausculation Bilateral, NORMAL BREATHING PATTERN





- Cardiovascular Exam


Cardiovascular Exam: REGULAR RHYTHM, RRR





- GI/Abdominal Exam


GI & Abdominal Exam: Distended, Soft, Normal Bowel Sounds





- Extremities Exam


Extremities Exam: Normal Inspection





- Neurological Exam


Neurological Exam: Alert, Awake, Oriented x3





- Psychiatric Exam


Psychiatric exam: Normal Affect, Normal Mood





- Skin


Skin Exam: Dry, Intact, Warm





Assessment and Plan


(1) TEN (acute kidney injury)


Status: Acute   





(2) Acute pyelonephritis


Status: Acute   





(3) CKD (chronic kidney disease) stage 4, GFR 15-29 ml/min


Status: Acute   





(4) Proteinuria due to type 2 diabetes mellitus


Status: Acute   





(5) Hydronephrosis


Status: Acute   





- Assessment and Plan (Free Text)


Assessment: 





renal function stabilizing, off iv fluids


antibiotics for pyelonephritis


recommend outpt urology follow up, chronic rt hydronephrosis (solitary kidney)


anemia of renal dz. one dose of cuco ordered. check iron stores

## 2018-02-12 NOTE — CP.PCM.CON
Past Patient History





- Past Medical History & Family History


Past Medical History?: Yes


Past Family History: Reviewed and not pertinent





- Past Social History


Smoking Status: Never Smoked


Chewing Tobacco Use: No


Cigar Use: No


Alcohol: None


Drugs: Denies


Home Situation {Lives}: With Family





- CARDIAC


Hx Congestive Heart Failure: Yes


Hx Hypercholesterolemia: Yes


Hx Hypertension: Yes





- PULMONARY


Hx Respiratory Disorders: No





- NEUROLOGICAL


Hx Neurological Disorder: No





- HEENT


Hx HEENT Problems: No


Other/Comment: Reading glasses





- RENAL


Hx Chronic Kidney Disease: Yes





- ENDOCRINE/METABOLIC


Hx Endocrine Disorders: Yes


Hx Diabetes Mellitus Type 2: Yes





- HEMATOLOGICAL/ONCOLOGICAL


Hx Anemia: Yes





- INTEGUMENTARY


Hx Dermatological Problems: No





- MUSCULOSKELETAL/RHEUMATOLOGICAL


Hx Arthritis: Yes





- GASTROINTESTINAL


Hx Gastrointestinal Disorders: Yes


Hx Colostomy: Yes (left , colon cancer)


Other/Comment: Hx of colon cancer





- GENITOURINARY/GYNECOLOGICAL


Hx Genitourinary Disorders: Yes


Hx Prostate Problems: Yes


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





- PSYCHIATRIC


Hx Substance Use: No





- SURGICAL HISTORY


Hx Surgeries: Yes


Other/Comment: COLON RESECTION 2001 WITH COLOSTOMY; COLOSTOMY REVISION 2007





- ANESTHESIA


Hx Anesthesia: Yes


Hx Anesthesia Reactions: No


Hx Malignant Hyperthermia: No





Meds


Allergies/Adverse Reactions: 


 Allergies











Allergy/AdvReac Type Severity Reaction Status Date / Time


 


No Known Allergies Allergy   Verified 02/08/18 09:13














- Medications


Medications: 


 Current Medications





Acetaminophen (Tylenol 325mg Tab)  650 mg PO Q6 PRN


   PRN Reason: Fever >100.4 F


   Last Admin: 02/08/18 16:08 Dose:  650 mg


Clopidogrel Bisulfate (Plavix)  75 mg PO DAILY [facility]


   Last Admin: 02/12/18 09:12 Dose:  75 mg


Dextrose (Dextrose 50% Inj)  0 ml IV STAT PRN; Protocol


   PRN Reason: Hypoglycemia Protocol


Dextrose (Glutose 15)  0 gm PO ONCE PRN; Protocol


   PRN Reason: Hypoglycemia Protocol


Ferrous Sulfate (Feosol)  325 mg PO BID [facility]


   Last Admin: 02/12/18 17:06 Dose:  325 mg


Glucagon (Glucagen Diagnostic Kit)  0 mg IM STAT PRN; Protocol


   PRN Reason: Hypoglycemia Protocol


Dextrose (Dextrose 5% In Water 1000 Ml)  1,000 mls @ 0 mls/hr IV .Q0M PRN; 

Protocol; Per Protocol


   PRN Reason: Hypoglycemia Protocol


Cefepime HCl 1 gm/ Dextrose  50 mls @ 100 mls/hr IVPB Q12H [facility]


   Last Admin: 02/12/18 22:20 Dose:  100 mls/hr


Insulin Detemir (Levemir)  9 unit SC [facility]


   Last Admin: 02/12/18 22:21 Dose:  9 unit


Insulin Human Regular (Novolin R)  [facility]


   PRN Reason: Protocol


   Last Admin: 02/12/18 21:51 Dose:  Not Given


Multivitamins (Hexavitamin)  1 tab PO DAILY [facility]


   Last Admin: 02/12/18 09:12 Dose:  1 tab


Pantoprazole Sodium (Protonix Ec Tab)  40 mg PO DAILY [facility]


   Last Admin: 02/12/18 09:12 Dose:  40 mg


Rosuvastatin Calcium (Crestor)  10 mg PO HS [facility]


   Last Admin: 02/12/18 22:22 Dose:  10 mg


Saccharomyces Boulardii (Florastor)  250 mg PO BID [facility]


   Last Admin: 02/12/18 17:06 Dose:  250 mg


Sodium Bicarbonate (Sodium Bicarbonate Tab)  650 mg PO BID [facility]


   Last Admin: 02/12/18 17:06 Dose:  650 mg


Tamsulosin HCl (Flomax)  0.4 mg PO DAILY [facility]


   Last Admin: 02/12/18 09:12 Dose:  0.4 mg











Results





- Vital Signs


Recent Vital Signs: 


 Last Vital Signs











Temp  97.9 F   02/12/18 16:17


 


Pulse  77   02/12/18 16:17


 


Resp  20   02/12/18 16:17


 


BP  133/69   02/12/18 16:17


 


Pulse Ox  98   02/12/18 16:17














- Labs


Result Diagrams: 


 02/12/18 07:35





 02/12/18 07:35


Labs: 


 Laboratory Results - last 24 hr











  02/10/18 02/12/18 02/12/18





  07:10 06:31 07:35


 


WBC    7.6


 


RBC    3.47 L


 


Hgb    7.2 L


 


Hct    22.1 L


 


MCV    63.8 L


 


MCH    20.7 L


 


MCHC    32.5 L


 


RDW    17.3 H


 


Plt Count    219


 


MPV    7.1 L


 


Neut % (Auto)    62.3


 


Lymph % (Auto)    21.9


 


Mono % (Auto)    11.8 H


 


Eos % (Auto)    3.6


 


Baso % (Auto)    0.4


 


Neut # (Auto)    4.7


 


Lymph # (Auto)    1.7


 


Mono # (Auto)    0.9 H


 


Eos # (Auto)    0.3


 


Baso # (Auto)    0.0


 


Sodium   


 


Potassium   


 


Chloride   


 


Carbon Dioxide   


 


Anion Gap   


 


BUN   


 


Creatinine   


 


Est GFR ( Amer)   


 


Est GFR (Non-Af Amer)   


 


POC Glucose (mg/dL)   121 H 


 


Random Glucose   


 


Calcium   


 


Phosphorus   


 


Magnesium   


 


Total Bilirubin   


 


AST   


 


ALT   


 


Alkaline Phosphatase   


 


Total Protein   


 


Albumin   


 


Globulin   


 


Albumin/Globulin Ratio   


 


PTH Intact Whole Molec  68 H  














  02/12/18 02/12/18 02/12/18





  07:35 11:20 16:33


 


WBC   


 


RBC   


 


Hgb   


 


Hct   


 


MCV   


 


MCH   


 


MCHC   


 


RDW   


 


Plt Count   


 


MPV   


 


Neut % (Auto)   


 


Lymph % (Auto)   


 


Mono % (Auto)   


 


Eos % (Auto)   


 


Baso % (Auto)   


 


Neut # (Auto)   


 


Lymph # (Auto)   


 


Mono # (Auto)   


 


Eos # (Auto)   


 


Baso # (Auto)   


 


Sodium  135  


 


Potassium  5.2  


 


Chloride  104  


 


Carbon Dioxide  23  


 


Anion Gap  14  


 


BUN  25 H  


 


Creatinine  1.7 H  


 


Est GFR ( Amer)  47  


 


Est GFR (Non-Af Amer)  38  


 


POC Glucose (mg/dL)   253 H  234 H


 


Random Glucose  105  


 


Calcium  9.0  


 


Phosphorus  3.7  


 


Magnesium  1.7  


 


Total Bilirubin  0.7  


 


AST  74 H  


 


ALT  94 H D  


 


Alkaline Phosphatase  100  


 


Total Protein  6.8  


 


Albumin  3.4 L  


 


Globulin  3.4  


 


Albumin/Globulin Ratio  1.0  


 


PTH Intact Whole Molec   














  02/12/18





  21:05


 


WBC 


 


RBC 


 


Hgb 


 


Hct 


 


MCV 


 


MCH 


 


MCHC 


 


RDW 


 


Plt Count 


 


MPV 


 


Neut % (Auto) 


 


Lymph % (Auto) 


 


Mono % (Auto) 


 


Eos % (Auto) 


 


Baso % (Auto) 


 


Neut # (Auto) 


 


Lymph # (Auto) 


 


Mono # (Auto) 


 


Eos # (Auto) 


 


Baso # (Auto) 


 


Sodium 


 


Potassium 


 


Chloride 


 


Carbon Dioxide 


 


Anion Gap 


 


BUN 


 


Creatinine 


 


Est GFR ( Amer) 


 


Est GFR (Non-Af Amer) 


 


POC Glucose (mg/dL)  175 H


 


Random Glucose 


 


Calcium 


 


Phosphorus 


 


Magnesium 


 


Total Bilirubin 


 


AST 


 


ALT 


 


Alkaline Phosphatase 


 


Total Protein 


 


Albumin 


 


Globulin 


 


Albumin/Globulin Ratio 


 


PTH Intact Whole Molec 














Assessment & Plan





- Assessment and Plan (Free Text)


Assessment: 





Imp:


UTI


Hx of stricture


Hx of colon ca


Anemia





fullnote t/f





- Date & Time


Date: 02/12/18


Time: 11:30

## 2018-02-13 VITALS — DIASTOLIC BLOOD PRESSURE: 69 MMHG | SYSTOLIC BLOOD PRESSURE: 125 MMHG | HEART RATE: 80 BPM

## 2018-02-13 VITALS — OXYGEN SATURATION: 97 % | TEMPERATURE: 98.2 F

## 2018-02-13 LAB
% IRON SATURATION: 24 (ref 20–55)
ALBUMIN SERPL-MCNC: 3.9 G/DL (ref 3.5–5)
ALBUMIN/GLOB SERPL: 1 {RATIO} (ref 1–2.1)
ALT SERPL-CCNC: 94 U/L (ref 21–72)
AST SERPL-CCNC: 66 U/L (ref 17–59)
BACTERIA #/AREA URNS HPF: (no result) /[HPF]
BASOPHILS # BLD AUTO: 0 K/UL (ref 0–0.2)
BASOPHILS NFR BLD: 0.3 % (ref 0–2)
BILIRUB UR-MCNC: NEGATIVE MG/DL
BUN SERPL-MCNC: 23 MG/DL (ref 9–20)
CALCIUM SERPL-MCNC: 9.8 MG/DL (ref 8.6–10.4)
EOSINOPHIL # BLD AUTO: 0.3 K/UL (ref 0–0.7)
EOSINOPHIL NFR BLD: 3.1 % (ref 0–4)
ERYTHROCYTE [DISTWIDTH] IN BLOOD BY AUTOMATED COUNT: 17.5 % (ref 11.5–14.5)
GFR NON-AFRICAN AMERICAN: 41
GLUCOSE UR STRIP-MCNC: NORMAL MG/DL
HGB BLD-MCNC: 8.4 G/DL (ref 12–18)
IRON SERPL-MCNC: 57 UG/DL (ref 49–181)
LEUKOCYTE ESTERASE UR-ACNC: (no result) LEU/UL
LYMPHOCYTES # BLD AUTO: 2.1 K/UL (ref 1–4.3)
LYMPHOCYTES NFR BLD AUTO: 21.5 % (ref 20–40)
MAGNESIUM SERPL-MCNC: 1.6 MG/DL (ref 1.6–2.3)
MCH RBC QN AUTO: 21 PG (ref 27–31)
MCHC RBC AUTO-ENTMCNC: 32.6 G/DL (ref 33–37)
MCV RBC AUTO: 64.3 FL (ref 80–94)
MONOCYTES # BLD: 0.7 K/UL (ref 0–0.8)
MONOCYTES NFR BLD: 7.3 % (ref 0–10)
NEUTROPHILS # BLD: 6.7 K/UL (ref 1.8–7)
NEUTROPHILS NFR BLD AUTO: 67.8 % (ref 50–75)
NRBC BLD AUTO-RTO: 0.1 % (ref 0–2)
PH UR STRIP: 6 [PH] (ref 5–8)
PLATELET # BLD: 270 K/UL (ref 130–400)
PMV BLD AUTO: 7.6 FL (ref 7.2–11.7)
PROT UR STRIP-MCNC: NEGATIVE MG/DL
RBC # BLD AUTO: 4.02 MIL/UL (ref 4.4–5.9)
RBC # UR STRIP: NEGATIVE /UL
SP GR UR STRIP: 1.01 (ref 1–1.03)
SQUAMOUS EPITHIAL: < 1 /HPF (ref 0–5)
TIBC SERPL-MCNC: 241 UG/DL (ref 250–450)
URINE NITRATE: NEGATIVE
UROBILINOGEN UR-MCNC: NORMAL MG/DL (ref 0.2–1)
WBC # BLD AUTO: 9.8 K/UL (ref 4.8–10.8)

## 2018-02-13 RX ADMIN — HUMAN INSULIN SCH: 100 INJECTION, SOLUTION SUBCUTANEOUS at 12:00

## 2018-02-13 RX ADMIN — Medication SCH: at 09:47

## 2018-02-13 RX ADMIN — PANTOPRAZOLE SODIUM SCH MG: 40 TABLET, DELAYED RELEASE ORAL at 09:46

## 2018-02-13 RX ADMIN — Medication SCH TAB: at 09:45

## 2018-02-13 RX ADMIN — HUMAN INSULIN SCH: 100 INJECTION, SOLUTION SUBCUTANEOUS at 07:28

## 2018-02-13 NOTE — CON
DATE:  02/12/2018



UROLOGY CONSULTATION



UROLOGY CONSULTATION REQUESTED BY:  Dr. Jayson De Dios.



UROLOGY CONSULTATION FILLED BY:  Dr. Bisi Manrique



REASON FOR CONSULTATION:  Urinary tract infection.



HISTORY OF PRESENT ILLNESS:  The patient is an 85-year-old male admitted

with urinary tract infection.



The patient had difficulty voiding.  He voided with good stream.  He had

dysuria.  The patient also had fever.



No hematuria.



The patient has history of previous urinary tract infection.  He has

history of previous urethral stricture.



The patient had been seen by me several years ago.  He is subsequently

followed with another urologist, had been seen for and cared by another

urologist.



The patient reports no history of urolithiasis.



There is history of previous genital, renal problems.  The patient reports

no flank pain.  No nausea, vomiting.



Mr. Buckner lives with his wife.  He does not smoke.



There is history of hypertension and diabetes.



The patient reports no recent chest pain or shortness of breath.



PAST SURGICAL HISTORY:  The patient has undergone previous colon resection

and colostomy formation.  The patient reports that his rectum is closed.



The patient also has undergone some previous urologic procedure.



The patient reports he voids with good urinary stream and good control.  He

reports nocturia times two.



Mr. Buckner reports that he is feeling better overall since admission.



PHYSICAL EXAMINATION:

GENERAL:  Patient is well-developed, well-nourished elderly male.  The

patient is awake and alert.

ABDOMEN:  Soft, nontender, nondistended.  No mass or organomegaly.  The

colostomy is functioning.  There are well-healed abdominal scars.

GENITALIA: Without inflammation.  The glans is normal.  Meatus is normal. 

There is a possible apparent performance of dorsal slit and/or incomplete

circumcision.  Scrotal contents without inflammation.



LABORATORY DATA:  Reviewed as well.



IMPRESSION:  Urinary tract infection, now clinically improving.



RECOMMENDATION AND PLAN:   Serum PSA.  Continue antibiotic therapy as per

urine cultures.  Repeat urinalysis and urine culture.  Bladder scan to

check postvoid residual.

 

Further therapy to follow according to the patient's clinical course. 

Possible need for further urologic instrumentation.



I discussed the findings with the patient and as well.  The patient will

decide whether he will follow up with his urologist or with me.



Thank you for recommending the patient for urology consultation.





__________________________________________

Slater MD Hilaria



cc:  Dr. Jayson De Dios



DD:  02/13/2018 7:25:18

DT:  02/13/2018 7:31:44

Job # [id number]

## 2018-02-13 NOTE — CP.PCM.PN
Subjective





- Date & Time of Evaluation


Date of Evaluation: 02/13/18


Time of Evaluation: 10:10





- Subjective


Subjective: 





seen and examined, no complaints


labs reviewed





no f/c/sob/n/v/d/dizziness/headache/rash/cough








Objective





- Vital Signs/Intake and Output


Vital Signs (last 24 hours): 


 











Temp Pulse Resp BP Pulse Ox


 


 98.2 F   80   20   125/69   97 


 


 02/13/18 07:46  02/13/18 07:46  02/13/18 07:46  02/13/18 07:46  02/13/18 07:46











- Medications


Medications: 


 Current Medications





Acetaminophen (Tylenol 325mg Tab)  650 mg PO Q6 PRN


   PRN Reason: Fever >100.4 F


   Last Admin: 02/08/18 16:08 Dose:  650 mg


Clopidogrel Bisulfate (Plavix)  75 mg PO DAILY [facility]


   Last Admin: 02/13/18 09:46 Dose:  75 mg


Dextrose (Dextrose 50% Inj)  0 ml IV STAT PRN; Protocol


   PRN Reason: Hypoglycemia Protocol


Dextrose (Glutose 15)  0 gm PO ONCE PRN; Protocol


   PRN Reason: Hypoglycemia Protocol


Ferrous Sulfate (Feosol)  325 mg PO BID [facility]


   Last Admin: 02/13/18 09:45 Dose:  325 mg


Glucagon (Glucagen Diagnostic Kit)  0 mg IM STAT PRN; Protocol


   PRN Reason: Hypoglycemia Protocol


Dextrose (Dextrose 5% In Water 1000 Ml)  1,000 mls @ 0 mls/hr IV .Q0M PRN; 

Protocol; Per Protocol


   PRN Reason: Hypoglycemia Protocol


Cefepime HCl 1 gm/ Dextrose  50 mls @ 100 mls/hr IVPB Q12H [facility]


   Last Admin: 02/13/18 09:04 Dose:  100 mls/hr


Insulin Detemir (Levemir)  9 unit SC HS [facility]


   Last Admin: 02/12/18 22:21 Dose:  9 unit


Insulin Human Regular (Novolin R)  0 unit SC ACHS [facility]


   PRN Reason: Protocol


   Last Admin: 02/13/18 07:28 Dose:  Not Given


Multivitamins (Hexavitamin)  1 tab PO DAILY [facility]


   Last Admin: 02/13/18 09:45 Dose:  1 tab


Pantoprazole Sodium (Protonix Ec Tab)  40 mg PO DAILY [facility]


   Last Admin: 02/13/18 09:46 Dose:  40 mg


Rosuvastatin Calcium (Crestor)  10 mg PO HS [facility]


   Last Admin: 02/12/18 22:22 Dose:  10 mg


Saccharomyces Boulardii (Florastor)  250 mg PO BID [facility]


   Last Admin: 02/13/18 09:47 Dose:  Not Given


Sodium Bicarbonate (Sodium Bicarbonate Tab)  650 mg PO BID [facility]


   Last Admin: 02/13/18 09:45 Dose:  650 mg


Tamsulosin HCl (Flomax)  0.4 mg PO DAILY [facility]


   Last Admin: 02/13/18 09:45 Dose:  0.4 mg











- Labs


Labs: 


 





 02/13/18 07:51 





 02/13/18 07:51 











- Constitutional


Appears: Non-toxic, No Acute Distress





- Head Exam


Head Exam: NORMAL INSPECTION, NORMOCEPHALIC





- Eye Exam


Eye Exam: Normal appearance, PERRL





- ENT Exam


ENT Exam: Mucous Membranes Moist, Normal Exam





- Neck Exam


Neck Exam: Full ROM, Normal Inspection





- Respiratory Exam


Respiratory Exam: Clear to Ausculation Bilateral, NORMAL BREATHING PATTERN





- Cardiovascular Exam


Cardiovascular Exam: REGULAR RHYTHM, RRR





- GI/Abdominal Exam


GI & Abdominal Exam: Soft, Normal Bowel Sounds





- Extremities Exam


Extremities Exam: Full ROM, Normal Inspection





- Neurological Exam


Neurological Exam: Alert, Awake, Oriented x3





- Psychiatric Exam


Psychiatric exam: Normal Affect, Normal Mood





- Skin


Skin Exam: Intact, Normal Color, Warm





Assessment and Plan


(1) TEN (acute kidney injury)


Status: Acute   





(2) Acute pyelonephritis


Status: Acute   





(3) CKD (chronic kidney disease) stage 4, GFR 15-29 ml/min


Status: Acute   





(4) Proteinuria due to type 2 diabetes mellitus


Status: Acute   





(5) Hydronephrosis


Status: Acute   





- Assessment and Plan (Free Text)


Assessment: 





renal function stable, off iv fluids


antibiotics for pyelonephritis


recommend outpt urology follow up, chronic rt hydronephrosis (solitary kidney)


anemia of renal dz. on po iron. s/p cuco dose. follow up in office for cuco 

administration.


nephrovite

## 2018-02-13 NOTE — CP.PCM.DIS
<ChristophRoseannteodoro E - Last Filed: 18 16:17>





Provider





- Provider


Date of Admission: 


18 11:27





Attending physician: 


Melissa Tim DO





Time Spent in preparation of Discharge (in minutes): 35





Diagnosis





- Discharge Diagnosis


(1) Acute pyelonephritis


Status: Acute   





(2) TEN (acute kidney injury)


Status: Acute   





(3) Diabetes mellitus


Status: Acute   





(4) History of hypertension


Status: Acute   





(5) Hyperlipidemia


Status: Acute   





(6) Benign prostate hyperplasia


Status: Acute   





(7) Anemia


Status: Acute   





(8) History of CVA (cerebrovascular accident)


Status: Acute   





(9) History of rectal cancer


Status: Acute   





(10) Prophylactic measure


Status: Acute   





Hospital Course





- Lab Results


Lab Results: 


 Micro Results





18 10:00   Blood   Blood Culture - Final


                            NO GROWTH AFTER 5 DAYS


18 10:00   Blood   Gram Stain - Final


                            TEST NOT PERFORMED


18 10:15   Blood   Blood Culture - Final


                            NO GROWTH AFTER 5 DAYS


18 10:15   Blood   Gram Stain - Final


                            TEST NOT PERFORMED


18 13:31   Urine,Clean Catch   Urine Culture - Final


                            No Growth (<1,000 CFU/ML)


18 09:36   Urine   Urine Culture - Final


                            Proteus Mirabilis





 Most Recent Lab Values











WBC  9.8 K/uL (4.8-10.8)   18  07:51    


 


RBC  4.02 Mil/uL (4.40-5.90)  L  18  07:51    


 


Hgb  8.4 g/dL (12.0-18.0)  L  18  07:51    


 


Hct  25.8 % (35.0-51.0)  L  18  07:51    


 


MCV  64.3 fL (80.0-94.0)  L  18  07:51    


 


MCH  21.0 pg (27.0-31.0)  L  18  07:51    


 


MCHC  32.6 g/dL (33.0-37.0)  L  18  07:51    


 


RDW  17.5 % (11.5-14.5)  H  18  07:51    


 


Plt Count  270 K/uL (130-400)   18  07:51    


 


MPV  7.6 fL (7.2-11.7)   18  07:51    


 


Neut % (Auto)  67.8 % (50.0-75.0)   18  07:51    


 


Lymph % (Auto)  21.5 % (20.0-40.0)   18  07:51    


 


Mono % (Auto)  7.3 % (0.0-10.0)   18  07:51    


 


Eos % (Auto)  3.1 % (0.0-4.0)   18  07:51    


 


Baso % (Auto)  0.3 % (0.0-2.0)   18  07:51    


 


Neut # (Auto)  6.7 K/uL (1.8-7.0)   18  07:51    


 


Lymph # (Auto)  2.1 K/uL (1.0-4.3)   18  07:51    


 


Mono # (Auto)  0.7 K/uL (0.0-0.8)   18  07:51    


 


Eos # (Auto)  0.3 K/uL (0.0-0.7)   18  07:51    


 


Baso # (Auto)  0.0 K/uL (0.0-0.2)   18  07:51    


 


Neutrophils % (Manual)  79 % (50-75)  H  18  07:40    


 


Band Neutrophils %  1 % (0-2)   18  07:40    


 


Lymphocytes % (Manual)  13 % (20-40)  L  18  07:40    


 


Monocytes % (Manual)  6 % (0-10)   18  07:40    


 


Eosinophils % (Manual)  1 % (0-4)   18  07:40    


 


Platelet Estimate  Normal  (NORMAL)   18  07:40    


 


Hypochromasia (manual)  Slight   18  07:40    


 


Anisocytosis (manual)  Slight   18  07:40    


 


Microcytosis (manual)  Slight   18  07:40    


 


Target Cells  Slight   18  07:40    


 


Tear Drop Cells  Slight   18  07:40    


 


Ovalocytes  Slight   18  07:40    


 


[place] Cells  Slight   18  07:40    


 


Sodium  137 mmol/L (132-148)   18  07:51    


 


Potassium  4.8 mmol/L (3.6-5.2)   18  07:51    


 


Chloride  101 mmol/L ()   18  07:51    


 


Carbon Dioxide  24 mmol/L (22-30)   18  07:51    


 


Anion Gap  16  (10-20)   18  07:51    


 


BUN  23 mg/dL (9-20)  H  18  07:51    


 


Creatinine  1.6 mg/dL (0.8-1.5)  H  18  07:51    


 


Est GFR ( Amer)  50   18  07:51    


 


Est GFR (Non-Af Amer)  41   18  07:51    


 


POC Glucose (mg/dL)  175 mg/dL ()  H  18  21:05    


 


Random Glucose  130 mg/dL ()  H  18  07:51    


 


Calcium  9.8 mg/dl (8.6-10.4)   18  07:51    


 


Phosphorus  4.0 mg/dL (2.5-4.5)   18  07:51    


 


Magnesium  1.6 mg/dL (1.6-2.3)   18  07:51    


 


Iron  57 ug/dL ()   18  07:51    


 


TIBC  241 ug/dL (250-450)  L  18  07:51    


 


% Saturation  24  (20-55)   18  07:51    


 


Total Bilirubin  0.8 mg/dL (0.2-1.3)   18  07:51    


 


AST  66 U/L (17-59)  H  18  07:51    


 


ALT  94 U/L (21-72)  H  18  07:51    


 


Alkaline Phosphatase  115 U/L ()   18  07:51    


 


Total Protein  7.7 g/dL (6.3-8.3)   18  07:51    


 


Albumin  3.9 g/dL (3.5-5.0)   18  07:51    


 


Globulin  3.9 gm/dL (2.2-3.9)   18  07:51    


 


Albumin/Globulin Ratio  1.0  (1.0-2.1)   18  07:51    


 


PTH Intact Whole Molec  68 pg/mL (14-64)  H  02/10/18  07:10    


 


Urine Color  Yellow  (YELLOW)   18  15:11    


 


Urine Clarity  Clear  (Clear)   18  15:11    


 


Urine pH  5.0  (5.0-8.0)   18  15:11    


 


Ur Specific Gravity  1.009  (1.003-1.030)   18  15:11    


 


Urine Protein  Negative mg/dL (NEGATIVE)   18  15:11    


 


Urine Glucose (UA)  Normal mg/dL (Normal)   18  15:11    


 


Urine Ketones  Negative mg/dL (NEGATIVE)   18  15:11    


 


Urine Blood  Negative  (NEGATIVE)   18  15:11    


 


Urine Nitrate  Negative  (NEGATIVE)   18  15:11    


 


Urine Bilirubin  Negative  (NEGATIVE)   18  15:11    


 


Urine Urobilinogen  Normal mg/dL (0.2-1.0)   18  15:11    


 


Ur Leukocyte Esterase  Trace Avery/uL (Negative)   18  15:11    


 


Urine WBC (Auto)  10 /hpf (0-5)  H  18  15:11    


 


Urine RBC (Auto)  1 /hpf (0-3)   18  15:11    


 


Urine WBC Clumps (Auto)  Many /hpf (NONE)  H  18  10:22    


 


Ur Squamous Epith Cells  < 1 /hpf (0-5)   18  15:11    


 


Urine Bacteria  Occ  (<OCC)  H  18  10:22    


 


Urine Collection Time  24 HRS  02/10/18  16:53    


 


Urine Total Volume  900 mL  02/10/18  16:53    


 


Ur Protein 24 Hr Calc  216.0 mg/24hr ()   02/10/18  16:53    


 


Stool Occult Blood  Negative  (NEGATIVE)   18  23:03    


 


Influenza Typ A,B (EIA)  Negative for flu a/b  (NEGATIVE)   18  09:36    














- Hospital Course


Hospital Course: 


HPI ( As per admission):


This is a 85 year old male with PMHx of rectal cancer s/p colectomy in , 

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 

mass s/p resection in 2017, HTN, DM, HLD who complains of fever and burning 

with urination. The fever began 2 days ago; however, the dysuria began 

yesterday. Patient measured his temperature at home 103 degrees yesterday and 

102 degrees this morning before coming into the ED. Per patient, he will 

experience a UTI about once per year. Patient admits associated urinary 

frequency yesterday. Per patient, it felt like he was urinating every hour. 

Patient denies flank pain but admits chronic lower back pain for the past few 

weeks. Patient denies chills, chest pain, dyspnea, abdominal pain.  





Hospital Course: 


Patient was admitted with the consideration of urinary tract infection with 

complications of pyleonephritis; urine analysis and urine culture: UA: LE (3+), 

WBC (2005), RBC (7), Increased occult bacteria and Proteus Mirabilis, 

respectively. Infectious disease, Dr. Benitez was consulted, who recommended 

the appropriate antibiotics. Urology, Dr. Bisi Manrique was considered due to 

patient's history of congenital left sided renal agenesis and noted right 

hydronephrosis on renal US with recommendation for possible retrograde 

cystoscopy. Patient had an uneventful hospital course with improving symptoms 

over the course of admission. Patient was discharge with appropriate 

instructions and antibiotics as per ID recommendation. Patient plans to follow 

up with his urologist outpatient and nephrologist, Dr. Seo/ Dr. Vigil for 

epoetin injection outpatient. 





Pertinent Imagin. CT abdomen/pelvis without contrast (18): shows Right Perinephric 

Stranding with moderate hydroureternephrosis without calculus. Moderate size 

Right Inguinal Hernia containing loops of bowel without obstruction





2. Abdominal ultrasound (18): shows hydronephrosis








Discharge Exam





- Head Exam


Head Exam: ATRAUMATIC, NORMAL INSPECTION, NORMOCEPHALIC





- Eye Exam


Eye Exam: EOMI, Normal appearance





- ENT Exam


ENT Exam: Mucous Membranes Moist





- Respiratory Exam


Respiratory Exam: Clear to PA & Lateral, NORMAL BREATHING PATTERN





- Cardiovascular Exam


Cardiovascular Exam: REGULAR RHYTHM, +S1, +S2





- GI/Abdominal Exam


GI & Abdominal Exam: Normal Bowel Sounds, Soft





- Extremities Exam


Extremities exam: normal inspection





- Back Exam


Back exam: absent: CVA tenderness (L), CVA tenderness (R)





- Neurological Exam


Neurological exam: Alert, Oriented x3





- Psychiatric Exam


Psychiatric exam: Normal Affect





- Skin


Skin Exam: Normal Color





Discharge Plan





- Discharge Medications


Prescriptions: 


Cefpodoxime [Vantin] 200 mg PO DAILY 7 Days #7 tab





- Follow Up Plan


Condition: STABLE


Disposition: HOME/ ROUTINE


Instructions:  Cefpodoxime Proxetil (By mouth), Acute Kidney Injury (DC), 

Benign Prostatic Hypertrophy (DC), Urinary Tract Infection in Men (DC), Chronic 

Kidney Disease (DC), Diabetes Mellitus Type 2 in Adults (DC), Acute 

Pyelonephritis (DC), Diabetic Neuropathy (DC), Hypertension (GEN), Anemia (DC)


Additional Instructions: 


Please is medically stable for discharge


Patient is to start the following new medication:


1. Vantin 200mg PO daily for 7 days, (7 tablets)


Please resume all your home medications as prescribed


Please follow up with your urologist outpatient with 1 week with your CT abdomen

/Pelvis result from your hospital course at [facility] 


Please follow up with Nephrology, Dr. Seo or Dr. Mckeon, outpatient for 

EPOETIN injection 


Please follow with your primary medical physician or your can follow up with 

[facility] 


Please return to the hospital if symptoms resumes; fever, chills, nausea, 

vomiting, chest pain, SOB, palpitations or urinary discomfort


Referrals: 


[facility] [Provider Group]


Christiano Seo MD [Staff Provider] - 


Bessy Vigil MD [Staff Provider] - 





<Melissa Tim - Last Filed: 18 11:51>





Provider





- Provider


Date of Admission: 


18 11:27





Attending physician: 


Melissa Tim DO








Hospital Course





- Lab Results


Lab Results: 


 Micro Results





18 10:00   Blood   Blood Culture - Final


                            NO GROWTH AFTER 5 DAYS


18 10:00   Blood   Gram Stain - Final


                            TEST NOT PERFORMED


18 10:15   Blood   Blood Culture - Final


                            NO GROWTH AFTER 5 DAYS


18 10:15   Blood   Gram Stain - Final


                            TEST NOT PERFORMED


18 13:31   Urine,Clean Catch   Urine Culture - Final


                            No Growth (<1,000 CFU/ML)


18 09:36   Urine   Urine Culture - Final


                            Proteus Mirabilis





 Most Recent Lab Values











WBC  9.8 K/uL (4.8-10.8)   18  07:51    


 


RBC  4.02 Mil/uL (4.40-5.90)  L  18  07:51    


 


Hgb  8.4 g/dL (12.0-18.0)  L  18  07:51    


 


Hct  25.8 % (35.0-51.0)  L  18  07:51    


 


MCV  64.3 fL (80.0-94.0)  L  18  07:51    


 


MCH  21.0 pg (27.0-31.0)  L  18  07:51    


 


MCHC  32.6 g/dL (33.0-37.0)  L  18  07:51    


 


RDW  17.5 % (11.5-14.5)  H  18  07:51    


 


Plt Count  270 K/uL (130-400)   18  07:51    


 


MPV  7.6 fL (7.2-11.7)   18  07:51    


 


Neut % (Auto)  67.8 % (50.0-75.0)   18  07:51    


 


Lymph % (Auto)  21.5 % (20.0-40.0)   18  07:51    


 


Mono % (Auto)  7.3 % (0.0-10.0)   18  07:51    


 


Eos % (Auto)  3.1 % (0.0-4.0)   18  07:51    


 


Baso % (Auto)  0.3 % (0.0-2.0)   18  07:51    


 


Neut # (Auto)  6.7 K/uL (1.8-7.0)   18  07:51    


 


Lymph # (Auto)  2.1 K/uL (1.0-4.3)   18  07:51    


 


Mono # (Auto)  0.7 K/uL (0.0-0.8)   18  07:51    


 


Eos # (Auto)  0.3 K/uL (0.0-0.7)   18  07:51    


 


Baso # (Auto)  0.0 K/uL (0.0-0.2)   18  07:51    


 


Neutrophils % (Manual)  79 % (50-75)  H  18  07:40    


 


Band Neutrophils %  1 % (0-2)   18  07:40    


 


Lymphocytes % (Manual)  13 % (20-40)  L  18  07:40    


 


Monocytes % (Manual)  6 % (0-10)   18  07:40    


 


Eosinophils % (Manual)  1 % (0-4)   18  07:40    


 


Platelet Estimate  Normal  (NORMAL)   18  07:40    


 


Hypochromasia (manual)  Slight   18  07:40    


 


Anisocytosis (manual)  Slight   18  07:40    


 


Microcytosis (manual)  Slight   18  07:40    


 


Target Cells  Slight   18  07:40    


 


Tear Drop Cells  Slight   18  07:40    


 


Ovalocytes  Slight   18  07:40    


 


Jaycee Cells  Slight   18  07:40    


 


Sodium  137 mmol/L (132-148)   18  07:51    


 


Potassium  4.8 mmol/L (3.6-5.2)   18  07:51    


 


Chloride  101 mmol/L ()   18  07:51    


 


Carbon Dioxide  24 mmol/L (22-30)   18  07:51    


 


Anion Gap  16  (10-20)   18  07:51    


 


BUN  23 mg/dL (9-20)  H  18  07:51    


 


Creatinine  1.6 mg/dL (0.8-1.5)  H  18  07:51    


 


Est GFR ( Amer)  50   18  07:51    


 


Est GFR (Non-Af Amer)  41   18  07:51    


 


POC Glucose (mg/dL)  175 mg/dL ()  H  18  21:05    


 


Random Glucose  130 mg/dL ()  H  18  07:51    


 


Calcium  9.8 mg/dl (8.6-10.4)   18  07:51    


 


Phosphorus  4.0 mg/dL (2.5-4.5)   18  07:51    


 


Magnesium  1.6 mg/dL (1.6-2.3)   18  07:51    


 


Iron  57 ug/dL ()   18  07:51    


 


TIBC  241 ug/dL (250-450)  L  18  07:51    


 


% Saturation  24  (20-55)   18  07:51    


 


Total Bilirubin  0.8 mg/dL (0.2-1.3)   18  07:51    


 


AST  66 U/L (17-59)  H  18  07:51    


 


ALT  94 U/L (21-72)  H  18  07:51    


 


Alkaline Phosphatase  115 U/L ()   18  07:51    


 


Total Protein  7.7 g/dL (6.3-8.3)   18  07:51    


 


Albumin  3.9 g/dL (3.5-5.0)   18  07:51    


 


Globulin  3.9 gm/dL (2.2-3.9)   18  07:51    


 


Albumin/Globulin Ratio  1.0  (1.0-2.1)   18  07:51    


 


PTH Intact Whole Molec  68 pg/mL (14-64)  H  02/10/18  07:10    


 


Urine Color  Straw  (YELLOW)   18  16:14    


 


Urine Clarity  Clear  (Clear)   18  16:14    


 


Urine pH  6.0  (5.0-8.0)   18  16:14    


 


Ur Specific Gravity  1.009  (1.003-1.030)   18  16:14    


 


Urine Protein  Negative mg/dL (NEGATIVE)   18  16:14    


 


Urine Glucose (UA)  Normal mg/dL (Normal)   18  16:14    


 


Urine Ketones  Negative mg/dL (NEGATIVE)   18  16:14    


 


Urine Blood  Negative  (NEGATIVE)   18  16:14    


 


Urine Nitrate  Negative  (NEGATIVE)   18  16:14    


 


Urine Bilirubin  Negative  (NEGATIVE)   18  16:14    


 


Urine Urobilinogen  Normal mg/dL (0.2-1.0)   18  16:14    


 


Ur Leukocyte Esterase  Neg Avery/uL (Negative)   18  16:14    


 


Urine WBC (Auto)  1 /hpf (0-5)   18  16:14    


 


Urine RBC (Auto)  < 1 /hpf (0-3)   18  16:14    


 


Urine WBC Clumps (Auto)  Many /hpf (NONE)  H  18  10:22    


 


Ur Squamous Epith Cells  < 1 /hpf (0-5)   18  16:14    


 


Urine Bacteria  Mod  (<OCC)  H  18  16:14    


 


Urine Collection Time  24 HRS  02/10/18  16:53    


 


Urine Total Volume  900 mL  02/10/18  16:53    


 


Ur Protein 24 Hr Calc  216.0 mg/24hr ()   02/10/18  16:53    


 


Stool Occult Blood  Negative  (NEGATIVE)   18  23:03    


 


Influenza Typ A,B (EIA)  Negative for flu a/b  (NEGATIVE)   18  09:36    














Attending/Attestation





- Attestation


I have personally seen and examined this patient.: Yes


I have fully participated in the care of the patient.: Yes


I have reviewed all pertinent clinical information, including history, physical 

exam and plan: Yes


Notes (Text): 


This is a late computer entry for 18.


Patient seen, examined, and case discussed with day-time resident.


Patient seen this morning, watching television. patient denies acute complaints.


Discussed with nephrology, stable from their standpoint for discharge. 

Recommend to follow-up in the office for Epocrit injections.


Patient's prescription sent to pharmacy for Vantin as prescribed by infectious 

disease.


Recommend to patient to call radiology to make a CD of his CT scan so he can 

bring to his own urologist for further intervention. 





Assessment/Plan


1). UTI/Pyelonephritis


* ID Dr. SANJANA Benitez on board-->help appreciated


* Urology Dr. MASTER Manrique on board-->help appreciated


* Nephrology, Dr. Seo on board-->help appreciated


* CT Abdomen/Pelvis shows Right Perinephric Stranding with moderate 

hydroureternephrosis without calculus. Moderate size Right Inguinal Hernia 

containing loops of bowel without obstruction


* Meropenem 500 mg IV Q12H (-10/18) switched to Cefepime 1gram IV Q12H (

active 18)


 * Per ID, Vantin for 7 days on discharge and patient to follow-up with his 

urologist with copy of CT Abdomen/Pelvis report and CD for further intervention 

after completes PO antibiotic


* Urine Culture shows Proteus mirabilis sensitive to Meropenem


* Urine Culture (18): no growth


* Blood Culture is negative to date





2). Chronic Kidney Failure (CKD Stage 4)


* Nephrology Dr. Seo: follows patient in outpatient setting and will follow 

up baseline renal numbers


* Sodium Bicarbonate 650 mg PO BID 


* Creatinine improving


* Stable from their standpoint for discharge





3). Hx HTN


* Patient is on Altace at home which was held secondry to the elevated Cr


* Held Rampril on discharge given stabilization of renal function and blood 

pressure controlled





4). Hx DM 2 on Insulin


* Resume Januvia and Glimepride on discharge


* Resume insulin on discharge





5). Hx HLD


* Crestor 10 mg PO HS





6). Hx Rectal CA S/P LLQ Colostomy 


* working





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





8). Hx BPH


* Flomax 0.4 mg PO 1x/day





9). Hx Questionable CVA


* 18: Patient could not provide details other than "Dr. Moseley told me that 

I had a small stroke 5 to 6 months ago and started me on Plavix"


* Plavix 75 mg PO 1x/day


* Crestor 10mg POqHS





10). Hx Iron Deficiency Anemia


* Ferrous Sulfate 325 mg PO 2x/day


* HgB/Hct is at 7.1. Patient states that it is always low. There are no 

complaints of lightheadedness/dizziness, fatigue, palpitations. Vitals are 

stable. Transfuse should he become symptomatic or if HgB < 7.0


* Iron studies, reticulocyte count, occult blood, ferritin


* PMD is requesting from nephrology for prescription for Epocrit-->Discussed 

with Dr. Vigil, can only receive epocrit injections when comes in to the 

office for follow-up





11). Prophylaxis


* Tylenol 650 mg PO Q6H PRN Fever


* Heparin was discontinued due to Anemia and patient is on Bilateral SCDs


* MVI


* Protonix 40 mg PO 1x/day

## 2018-02-14 LAB — PSA SERPL-MCNC: 0.1 NG/ML
